# Patient Record
Sex: MALE | Race: WHITE | NOT HISPANIC OR LATINO | URBAN - NONMETROPOLITAN AREA
[De-identification: names, ages, dates, MRNs, and addresses within clinical notes are randomized per-mention and may not be internally consistent; named-entity substitution may affect disease eponyms.]

---

## 2017-01-01 ENCOUNTER — OFFICE VISIT - GICH (OUTPATIENT)
Dept: FAMILY MEDICINE | Facility: OTHER | Age: 82
End: 2017-01-01

## 2017-01-01 ENCOUNTER — HISTORY (OUTPATIENT)
Dept: FAMILY MEDICINE | Facility: OTHER | Age: 82
End: 2017-01-01

## 2017-01-01 ENCOUNTER — HOSPITAL ENCOUNTER (OUTPATIENT)
Dept: RADIOLOGY | Facility: OTHER | Age: 82
End: 2017-10-10
Attending: FAMILY MEDICINE

## 2017-01-01 ENCOUNTER — MEDICAL CORRESPONDENCE (OUTPATIENT)
Facility: CLINIC | Age: 82
End: 2017-01-01
Payer: COMMERCIAL

## 2017-01-01 ENCOUNTER — AMBULATORY - GICH (OUTPATIENT)
Dept: FAMILY MEDICINE | Facility: OTHER | Age: 82
End: 2017-01-01

## 2017-01-01 ENCOUNTER — AMBULATORY - GICH (OUTPATIENT)
Dept: SCHEDULING | Facility: OTHER | Age: 82
End: 2017-01-01

## 2017-01-01 ENCOUNTER — ANTICOAGULATION - GICH (OUTPATIENT)
Dept: INTERNAL MEDICINE | Facility: OTHER | Age: 82
End: 2017-01-01

## 2017-01-01 ENCOUNTER — HOSPITAL ENCOUNTER (OUTPATIENT)
Dept: RADIOLOGY | Facility: OTHER | Age: 82
End: 2017-10-19
Attending: FAMILY MEDICINE

## 2017-01-01 ENCOUNTER — COMMUNICATION - GICH (OUTPATIENT)
Dept: FAMILY MEDICINE | Facility: OTHER | Age: 82
End: 2017-01-01

## 2017-01-01 DIAGNOSIS — R07.89 OTHER CHEST PAIN: ICD-10-CM

## 2017-01-01 DIAGNOSIS — I48.20 CHRONIC ATRIAL FIBRILLATION (H): ICD-10-CM

## 2017-01-01 DIAGNOSIS — I48.91 ATRIAL FIBRILLATION (H): ICD-10-CM

## 2017-01-01 DIAGNOSIS — B36.9 SUPERFICIAL MYCOSIS: ICD-10-CM

## 2017-01-01 DIAGNOSIS — J06.9 ACUTE UPPER RESPIRATORY INFECTION: ICD-10-CM

## 2017-01-01 DIAGNOSIS — Z79.4 LONG TERM CURRENT USE OF INSULIN (H): ICD-10-CM

## 2017-01-01 DIAGNOSIS — Z23 ENCOUNTER FOR IMMUNIZATION: ICD-10-CM

## 2017-01-01 DIAGNOSIS — Z79.01 LONG TERM CURRENT USE OF ANTICOAGULANT: ICD-10-CM

## 2017-01-01 DIAGNOSIS — E78.00 PURE HYPERCHOLESTEROLEMIA: ICD-10-CM

## 2017-01-01 DIAGNOSIS — Z95.0 PRESENCE OF CARDIAC PACEMAKER: ICD-10-CM

## 2017-01-01 DIAGNOSIS — R05.9 COUGH: ICD-10-CM

## 2017-01-01 DIAGNOSIS — E11.9 TYPE 2 DIABETES MELLITUS WITHOUT COMPLICATIONS (H): ICD-10-CM

## 2017-01-01 DIAGNOSIS — L98.9 DISORDER OF SKIN OR SUBCUTANEOUS TISSUE: ICD-10-CM

## 2017-01-01 DIAGNOSIS — E08.42 DIABETES MELLITUS DUE TO UNDERLYING CONDITION WITH DIABETIC POLYNEUROPATHY (H): ICD-10-CM

## 2017-01-01 DIAGNOSIS — F43.21 ADJUSTMENT DISORDER WITH DEPRESSED MOOD: ICD-10-CM

## 2017-01-01 DIAGNOSIS — L57.0 ACTINIC KERATOSIS: ICD-10-CM

## 2017-01-01 DIAGNOSIS — M54.6 PAIN IN THORACIC SPINE: ICD-10-CM

## 2017-01-01 LAB
ALB RAND URINE - HISTORICAL: 13.7 MG/L
ANION GAP - HISTORICAL: 11 (ref 5–18)
BUN SERPL-MCNC: 16 MG/DL (ref 7–25)
BUN/CREAT RATIO - HISTORICAL: 16
CALCIUM SERPL-MCNC: 9.5 MG/DL (ref 8.6–10.3)
CHLORIDE SERPLBLD-SCNC: 106 MMOL/L (ref 98–107)
CO2 SERPL-SCNC: 26 MMOL/L (ref 21–31)
CREAT SERPL-MCNC: 0.99 MG/DL (ref 0.7–1.3)
CREATININE, URINE - HISTORICAL: 1.13 G/L
ESTIMATED AVERAGE GLUCOSE: 126 MG/DL
GFR IF NOT AFRICAN AMERICAN - HISTORICAL: >60 ML/MIN/1.73M2
GLUCOSE SERPL-MCNC: 70 MG/DL (ref 70–105)
HEMOGLOBIN A1C MONITORING (POCT) - HISTORICAL: 6 % (ref 4–6.2)
INR - HISTORICAL: 2.5
INR - HISTORICAL: 2.7
MICROALBUMIN, RAND UR - HISTORICAL: 12.1 MG/G CREAT
POTASSIUM SERPL-SCNC: 3.9 MMOL/L (ref 3.5–5.1)
SODIUM SERPL-SCNC: 143 MMOL/L (ref 133–143)

## 2017-01-01 PROCEDURE — 93306 TTE W/DOPPLER COMPLETE: CPT | Mod: 26 | Performed by: INTERNAL MEDICINE

## 2017-01-01 ASSESSMENT — PATIENT HEALTH QUESTIONNAIRE - PHQ9: SUM OF ALL RESPONSES TO PHQ QUESTIONS 1-9: 0

## 2017-01-02 ENCOUNTER — COMMUNICATION - GICH (OUTPATIENT)
Dept: FAMILY MEDICINE | Facility: OTHER | Age: 82
End: 2017-01-02

## 2017-01-02 DIAGNOSIS — E78.00 PURE HYPERCHOLESTEROLEMIA: ICD-10-CM

## 2017-01-11 ENCOUNTER — ANTICOAGULATION - GICH (OUTPATIENT)
Dept: INTERNAL MEDICINE | Facility: OTHER | Age: 82
End: 2017-01-11

## 2017-01-11 DIAGNOSIS — Z79.01 LONG TERM CURRENT USE OF ANTICOAGULANT: ICD-10-CM

## 2017-01-11 DIAGNOSIS — I48.91 ATRIAL FIBRILLATION (H): ICD-10-CM

## 2017-01-11 LAB
ERYTHROCYTE [DISTWIDTH] IN BLOOD BY AUTOMATED COUNT: 12.3 % (ref 11.5–15.5)
HCT VFR BLD AUTO: 48.8 % (ref 37–53)
HEMOGLOBIN: 17.1 G/DL (ref 13.5–17.5)
INR - HISTORICAL: 1.9
MCH RBC QN AUTO: 33.3 PG (ref 26–34)
MCHC RBC AUTO-ENTMCNC: 35.1 G/DL (ref 32–36)
MCV RBC AUTO: 95 FL (ref 80–100)
PLATELET # BLD AUTO: 133 THOU/CU MM (ref 140–440)
PMV BLD: 8.7 FL (ref 6.5–11)
RED BLOOD COUNT - HISTORICAL: 5.15 MIL/CU MM (ref 4.3–5.9)
WHITE BLOOD COUNT - HISTORICAL: 7.1 THOU/CU MM (ref 4.5–11)

## 2017-02-06 ENCOUNTER — AMBULATORY - GICH (OUTPATIENT)
Dept: SCHEDULING | Facility: OTHER | Age: 82
End: 2017-02-06

## 2017-02-08 ENCOUNTER — ANTICOAGULATION - GICH (OUTPATIENT)
Dept: INTERNAL MEDICINE | Facility: OTHER | Age: 82
End: 2017-02-08

## 2017-02-08 DIAGNOSIS — I48.91 ATRIAL FIBRILLATION (H): ICD-10-CM

## 2017-02-08 DIAGNOSIS — Z79.01 LONG TERM CURRENT USE OF ANTICOAGULANT: ICD-10-CM

## 2017-02-08 LAB — INR - HISTORICAL: 2.4

## 2017-02-10 ENCOUNTER — COMMUNICATION - GICH (OUTPATIENT)
Dept: FAMILY MEDICINE | Facility: OTHER | Age: 82
End: 2017-02-10

## 2017-02-10 DIAGNOSIS — M50.30 OTHER CERVICAL DISC DEGENERATION, UNSPECIFIED CERVICAL REGION: ICD-10-CM

## 2017-02-20 ENCOUNTER — AMBULATORY - GICH (OUTPATIENT)
Dept: SCHEDULING | Facility: OTHER | Age: 82
End: 2017-02-20

## 2017-03-03 ENCOUNTER — OFFICE VISIT - GICH (OUTPATIENT)
Dept: FAMILY MEDICINE | Facility: OTHER | Age: 82
End: 2017-03-03

## 2017-03-03 ENCOUNTER — COMMUNICATION - GICH (OUTPATIENT)
Dept: FAMILY MEDICINE | Facility: OTHER | Age: 82
End: 2017-03-03

## 2017-03-03 ENCOUNTER — HISTORY (OUTPATIENT)
Dept: FAMILY MEDICINE | Facility: OTHER | Age: 82
End: 2017-03-03

## 2017-03-03 DIAGNOSIS — E11.3599 TYPE 2 DIABETES MELLITUS WITH PROLIFERATIVE RETINOPATHY WITHOUT MACULAR EDEMA (H): ICD-10-CM

## 2017-03-06 ENCOUNTER — AMBULATORY - GICH (OUTPATIENT)
Dept: SCHEDULING | Facility: OTHER | Age: 82
End: 2017-03-06

## 2017-04-04 ENCOUNTER — HISTORY (OUTPATIENT)
Dept: FAMILY MEDICINE | Facility: OTHER | Age: 82
End: 2017-04-04

## 2017-04-04 ENCOUNTER — OFFICE VISIT - GICH (OUTPATIENT)
Dept: FAMILY MEDICINE | Facility: OTHER | Age: 82
End: 2017-04-04

## 2017-04-04 ENCOUNTER — COMMUNICATION - GICH (OUTPATIENT)
Dept: FAMILY MEDICINE | Facility: OTHER | Age: 82
End: 2017-04-04

## 2017-04-04 DIAGNOSIS — M50.30 OTHER CERVICAL DISC DEGENERATION, UNSPECIFIED CERVICAL REGION: ICD-10-CM

## 2017-04-04 DIAGNOSIS — E11.9 TYPE 2 DIABETES MELLITUS WITHOUT COMPLICATIONS (H): ICD-10-CM

## 2017-04-04 DIAGNOSIS — Z79.4 LONG TERM CURRENT USE OF INSULIN (H): ICD-10-CM

## 2017-04-04 DIAGNOSIS — E11.3599 TYPE 2 DIABETES MELLITUS WITH PROLIFERATIVE RETINOPATHY WITHOUT MACULAR EDEMA (H): ICD-10-CM

## 2017-04-04 DIAGNOSIS — E78.00 PURE HYPERCHOLESTEROLEMIA: ICD-10-CM

## 2017-04-04 DIAGNOSIS — I48.91 ATRIAL FIBRILLATION (H): ICD-10-CM

## 2017-05-03 ENCOUNTER — ANTICOAGULATION - GICH (OUTPATIENT)
Dept: INTERNAL MEDICINE | Facility: OTHER | Age: 82
End: 2017-05-03

## 2017-05-03 DIAGNOSIS — I48.91 ATRIAL FIBRILLATION (H): ICD-10-CM

## 2017-05-03 DIAGNOSIS — Z79.01 LONG TERM CURRENT USE OF ANTICOAGULANT: ICD-10-CM

## 2017-05-03 LAB — INR - HISTORICAL: 2.6

## 2017-05-19 ENCOUNTER — HISTORY (OUTPATIENT)
Dept: FAMILY MEDICINE | Facility: OTHER | Age: 82
End: 2017-05-19

## 2017-05-19 ENCOUNTER — OFFICE VISIT - GICH (OUTPATIENT)
Dept: FAMILY MEDICINE | Facility: OTHER | Age: 82
End: 2017-05-19

## 2017-05-19 DIAGNOSIS — L57.0 ACTINIC KERATOSIS: ICD-10-CM

## 2017-12-27 NOTE — PROGRESS NOTES
Patient Information     Patient Name MRN Sex Khoi Luo [id number] Male 3/29/1932      Progress Notes by Stephen Morataya MD at 2017 10:15 AM     Author:  Stephen Morataya MD Service:  (none) Author Type:  Physician     Filed:  2017 10:46 AM Encounter Date:  2017 Status:  Signed     :  Stephen Morataya MD (Physician)            SUBJECTIVE:    Khoi Rodriguez is a 85 y.o. male who presents for follow up face lesion     HPI    He is no longer having any itching or burning.  Some pulling from the sutures.  No bleeding.  Has been applying ketoconazole now twice daily and his home care nurse says it is about 50% smaller.    Pathology report was non diagnostic.  Showed inflammation but no cancer.    No Known Allergies,   Current Outpatient Prescriptions on File Prior to Visit       Medication  Sig Dispense Refill     aspirin 81 mg tablet Take 81 mg by mouth once daily with a meal.       gabapentin (NEURONTIN) 300 mg capsule Take 1 capsule by mouth 3 times daily. 270 capsule 3     insulin aspart protamine-insulin aspart (NOVOLOG MIX 70-30) 100 unit/mL (70-30) FLEXPEN Inject 40 Units subcutaneous 2 times daily before meals. 100 mL 3     ketoconazole 2% topical (NIZORAL) cream Apply  topically to affected area(s) 2 times daily. 1 Tube 3     melatonin 10 mg tab Take  by mouth.  0     metroNIDAZOLE (METROGEL) 0.75 % gel Apply  topically to affected area(s) 2 times daily. 45 g 11     nitroglycerin (NITROSTAT) 0.3 mg SL tablet Place 0.3 mg under the tongue every 5 minutes if needed.       nystatin (MYCOSTATIN) cream Apply  topically to affected area(s) 2 times daily. To rash       simvastatin (ZOCOR) 40 mg tablet Take 1 tablet by mouth at bedtime. 90 tablet 0     warfarin (COUMADIN) 2.5 mg tablet TAKE ONE TABLET BY MOUTH SIX DAYS A WEEK AND THEN TWO TABLETS BY MOUTH ONE DAY PER WEEK 180 tablet 3     No current facility-administered medications on file prior to visit.    ,   Past Medical History:      Diagnosis  Date     Abnormal cardiac CT angiography     Occlusion of 2 obtuse marginal stents,70% mid LAD  lesion stented.      Atrial fibrillation (HC)      Atrial fibrillation (HC)      Closed compression fracture of thoracic vertebra (HC) 10/22/2015    Incidental finding on CXR 10/21/15--T8-9--new from CXR 2009      Coronary artery stenting 06/2006     Coronary heart disease      Coronary heart disease      Diabetes mellitus, type 2 (HC)      Diabetes mellitus, type 2 (HC)      Hx of left cataract extraction      LAD stenosis 07/06    Followup angiography showed occlusion of the two obtuse marginal stents, 70% mid LAD lesion which was stented.        S/P coronary artery stent placement 2006    Arthrectomy      S/P laparoscopic cholecystectomy 05/01     S/P placement of cardiac pacemaker 2002    and   Past Surgical History:      Procedure  Laterality Date     CATARACT REMOVAL      Left eye       LAP CHOLECYSTECTOMY  2001       REVIEW OF SYSTEMS:  Review of Systems   Constitutional: Negative for chills and fever.   Skin: Positive for rash. Negative for itching.   Neurological: Negative for headaches.       OBJECTIVE:  /68  Resp 16    EXAM:   Physical Exam   Constitutional: He is oriented to person, place, and time and well-developed, well-nourished, and in no distress. No distress.   Neurological: He is alert and oriented to person, place, and time.   Skin: He is not diaphoretic.   Right cheek lesion now is about 1.5 cm or so.  sig less irritaiton, no ulceration.  2 sutures from biopsy removed.       ASSESSMENT/PLAN:    ICD-10-CM    1. 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  We talked about a Dermatology consult next, but given this is at least 50% better now, I assume it is a recurrence of the original fungal infection which was on the first biopsy years ago.  He is comfortable for now continuing with the ketaconazole twice daily for at least 3-4 weeks more. Follow up as  needed.    Stephen Morataya MD ....................  7/28/2017   10:45 AM

## 2017-12-28 NOTE — PATIENT INSTRUCTIONS
Patient Information     Patient Name MRN Khoi Dinh [id number] Male 3/29/1932      Patient Instructions by Jazmin Montiel RN at 10/10/2017 10:00 AM     Author:  Jazmin Montiel RN Service:  (none) Author Type:  NURS- Registered Nurse     Filed:  10/10/2017 10:01 AM Encounter Date:  10/10/2017 Status:  Signed     :  Jazmin Montiel RN (NURS- Registered Nurse)            2017 Details    Sun Mon Tue Wed Thu Fri Sat     1               2               3               4               5               6               7                 8               9               10      2.5 mg   See details      11      2.5 mg         12      5 mg         13      2.5 mg         14      2.5 mg           15      2.5 mg         16      2.5 mg         17      2.5 mg         18      2.5 mg         19      5 mg         20      2.5 mg         21      2.5 mg           22      2.5 mg         23      2.5 mg         24      2.5 mg         25      2.5 mg         26      5 mg         27      2.5 mg         28      2.5 mg           29      2.5 mg         30      2.5 mg         31      2.5 mg              Date Details   10/10 This INR check               How to take your warfarin dose     To take:  2.5 mg Take one of the 2.5 mg tablets.    To take:  5 mg Take two of the 2.5 mg tablets.           2017 Details    Sun Mon Tue Wed Thu Fri Sat        1      2.5 mg         2      5 mg         3      2.5 mg         4      2.5 mg           5      2.5 mg         6      2.5 mg         7      2.5 mg         8      2.5 mg         9      5 mg         10      2.5 mg         11      2.5 mg           12      2.5 mg         13      2.5 mg         14      2.5 mg         15      2.5 mg         16      5 mg         17      2.5 mg         18      2.5 mg           19      2.5 mg         20      2.5 mg         21      2.5 mg         22      2.5 mg         23      5 mg         24      2.5 mg         25      2.5 mg           26       2.5 mg         27      2.5 mg         28      2.5 mg         29      2.5 mg         30      5 mg            Date Details   No additional details            How to take your warfarin dose     To take:  2.5 mg Take one of the 2.5 mg tablets.    To take:  5 mg Take two of the 2.5 mg tablets.           December 2017 Details    Sun Mon Tue Wed Thu Fri Sat          1      2.5 mg         2      2.5 mg           3      2.5 mg         4      2.5 mg         5      2.5 mg         6      2.5 mg         7      5 mg         8      2.5 mg         9      2.5 mg           10      2.5 mg         11      2.5 mg         12      2.5 mg         13      2.5 mg         14      5 mg         15      2.5 mg         16      2.5 mg           17      2.5 mg         18      2.5 mg         19      2.5 mg         20      2.5 mg         21      5 mg         22      2.5 mg         23      2.5 mg           24      2.5 mg         25      2.5 mg         26      2.5 mg         27      2.5 mg         28      5 mg         29      2.5 mg         30      2.5 mg           31      2.5 mg                Date Details   No additional details            How to take your warfarin dose     To take:  2.5 mg Take one of the 2.5 mg tablets.    To take:  5 mg Take two of the 2.5 mg tablets.           January 2018 Details    Sun Mon Tue Wed Thu Fri Sat      1      2.5 mg         2      2.5 mg         3               4               5               6                 7               8               9               10               11               12               13                 14               15               16               17               18               19               20                 21               22               23               24               25               26               27                 28               29               30               31                   Date Details   No additional details    Date of next INR:  1/2/2018         How  to take your warfarin dose     To take:  2.5 mg Take one of the 2.5 mg tablets.             Description          Continue same Warfarin dose and recheck in 12 weeks  Jazmin Montiel RN    10/10/2017  10:00 AM    Reiterated reasons to come have INR checked sooner                                    Anticoagulation Summary as of 10/10/2017     INR goal 2.0-3.0    Today's INR 2.5    Next INR check 1/2/2018          Call your Anticoagulation Clinic at Dept: [phone]   if:   1. Any medications are started, stopped, or there is a change in dose.  2. You experience any bleeding that is not easily stopped or if it is recurrent.  3. You notice an increase in bruising or any bruising that does not heal.  4. You are scheduled for surgery, colonoscopy, dental extraction or any other procedure where you may need to stop your Coumadin (warfarin).

## 2017-12-28 NOTE — PROGRESS NOTES
Patient Information     Patient Name MRN Sex Khoi Blanca [id number] Male 3/29/1932      Progress Notes by Stephen Morataya MD at 2017 10:00 AM     Author:  Stephen Morataya MD Service:  (none) Author Type:  Physician     Filed:  2017 10:21 AM Encounter Date:  2017 Status:  Signed     :  Stephen Morataya MD (Physician)            SUBJECTIVE:    Khoi Rodriguez is a 85 y.o. male who presents for back pain and cough    HPI    4 days of cough, sneezing and frontal sinus pain.  Had some nasal discharge at first, this is gone.  Has gotten hoarse in the last day.  No fevers.  No known exposures, but spends time at the [employer].  Mild clear sputum.    Upper back pain is much better.  Mild flare from the cough.    He feels the celexa has helped his mood.  No side effects.    No Known Allergies,   Current Outpatient Prescriptions on File Prior to Visit       Medication  Sig Dispense Refill     aspirin 81 mg tablet Take 81 mg by mouth once daily with a meal.       citalopram (CELEXA) 10 mg tablet Take 1 tablet by mouth once daily. 90 tablet 3     gabapentin (NEURONTIN) 300 mg capsule Take 1 capsule by mouth 3 times daily. 270 capsule 3     insulin aspart protamine-insulin aspart (NOVOLOG MIX 70-30) 100 unit/mL (70-30) FLEXPEN Inject 40 Units subcutaneous 2 times daily before meals. 100 mL 3     ketoconazole 2% topical (NIZORAL) cream Apply  topically to affected area(s) 2 times daily. 1 Tube 3     melatonin 10 mg tab Take  by mouth.  0     metroNIDAZOLE (METROGEL) 0.75 % gel Apply  topically to affected area(s) 2 times daily. 45 g 11     nitroglycerin (NITROSTAT) 0.3 mg SL tablet Place 0.3 mg under the tongue every 5 minutes if needed.       nystatin (MYCOSTATIN) cream Apply  topically to affected area(s) 2 times daily. To rash       simvastatin (ZOCOR) 40 mg tablet TAKE 1 TABLET BY MOUTH AT BEDTIME 90 tablet 2     traMADol (ULTRAM) 50 mg tablet Take 1 tablet by mouth every 6 hours if needed  for Pain. 30 tablet 3     warfarin (COUMADIN) 2.5 mg tablet TAKE ONE TABLET BY MOUTH SIX DAYS A WEEK AND THEN TWO TABLETS BY MOUTH ONE DAY PER WEEK 180 tablet 3     No current facility-administered medications on file prior to visit.    ,   Past Medical History:     Diagnosis  Date     Abnormal cardiac CT angiography     Occlusion of 2 obtuse marginal stents,70% mid LAD  lesion stented.      Atrial fibrillation (HC)      Atrial fibrillation (HC)      Closed compression fracture of thoracic vertebra (HC) 10/22/2015    Incidental finding on CXR 10/21/15--T8-9--new from CXR 2009      Coronary artery stenting 06/2006     Coronary heart disease      Coronary heart disease      Diabetes mellitus, type 2 (HC)      Diabetes mellitus, type 2 (HC)      Hx of left cataract extraction      LAD stenosis 07/06    Followup angiography showed occlusion of the two obtuse marginal stents, 70% mid LAD lesion which was stented.        S/P coronary artery stent placement 2006    Arthrectomy      S/P laparoscopic cholecystectomy 05/01     S/P placement of cardiac pacemaker 2002    and   Past Surgical History:      Procedure  Laterality Date     CATARACT REMOVAL      Left eye       LAP CHOLECYSTECTOMY  2001       REVIEW OF SYSTEMS:  Review of Systems   Constitutional: Negative for chills and fever.   HENT: Positive for congestion and sore throat.    Respiratory: Positive for cough and sputum production.    Musculoskeletal: Negative for back pain.   Psychiatric/Behavioral: Negative for depression.       OBJECTIVE:  /62  Temp 98.6  F (37  C) (Temporal)  Resp 16  Wt 86.8 kg (191 lb 6.4 oz)  BMI 28.35 kg/m2    EXAM:   Physical Exam   Constitutional: He is oriented to person, place, and time and well-developed, well-nourished, and in no distress. No distress.   HENT:   Mild pharyngeal injection and mild nasal edema.   Neck: Normal range of motion. No tracheal deviation present. No thyromegaly present.   Pulmonary/Chest: Effort  normal. No respiratory distress. He has no wheezes. He has no rales.   Neurological: He is alert and oriented to person, place, and time.   Skin: He is not diaphoretic.   Psychiatric: Memory, affect and judgment normal.       PHQ Depression Screening 10/31/2017 11/29/2017   Date of PHQ exam (doc flow) 10/31/2017 11/29/2017   1. Lack of interest/pleasure 0 - Not at all 0 - Not at all   2. Feeling down/depressed 0 - Not at all 0 - Not at all   PHQ-2 TOTAL SCORE 0 0   3. Trouble sleeping 0 - Not at all -   4. Decreased energy 0 - Not at all -   5. Appetite change 0 - Not at all -   6. Feelings of failure 0 - Not at all -   7. Trouble concentrating 0 - Not at all -   8. Activity level 0 - Not at all -   9. Hurting yourself 0 - Not at all -   PHQ-9 TOTAL SCORE 0 -   PHQ-9 Severity Level none -   Functional Impairment not applicable -   Some recent data might be hidden         ASSESSMENT/PLAN:    ICD-10-CM    1. Acute URI J06.9         Plan:  The upper respiratory infection is already improving.  Supportive cares and follow up as needed.    Stephen Morataya MD ....................  11/29/2017   10:20 AM

## 2017-12-28 NOTE — ADDENDUM NOTE
Patient Information     Patient Name MRN Khoi Dinh [id number] Male 3/29/1932      Addendum Note by Stephen Morataya MD at 10/10/2017 12:06 PM     Author:  Stephen Morataya MD Service:  (none) Author Type:  Physician     Filed:  10/10/2017 12:06 PM Encounter Date:  10/10/2017 Status:  Signed     :  Stephen Morataya MD (Physician)       Addended by: STEPHEN MORATAYA on: 10/10/2017 12:06 PM        Modules accepted: Orders

## 2017-12-28 NOTE — PROGRESS NOTES
Patient Information     Patient Name MRN Sex Khoi Blanca [id number] Male 3/29/1932      Progress Notes by Stephen Morataya MD at 10/31/2017 10:45 AM     Author:  Stephen Morataya MD Service:  (none) Author Type:  Physician     Filed:  10/31/2017 11:11 AM Encounter Date:  10/31/2017 Status:  Signed     :  Stephen Morataya MD (Physician)            SUBJECTIVE:    Khoi Rodriguez is a 85 y.o. male who presents for back pain    HPI    Has had pains now for over 6 weeks.  Had a chest radiograph, showed a stable lower thoracic compression fractures.  He has pains now really in the T 4 area or so.  Between the scapulae.  Worse when he gets into bed, but improves once laying flat.  Ultram is not really helping.  Does not feel truly weak.  Coughing and sneezing makes the pains much worse.  Pain much worse with deep breaths.  No fevers.      Mildly productive cough as well.  No better nor worse from 3 weeks ago.  Clear sputum.    Feeling more depressed, with winter coming on and the pending 2 year anniversary of his wife's death.  Is home alone much of the time.  Goes to the [facility] daily, otherwise is pretty isolated.  Is sad about not being able to go deer hunting.  A long term friend of his just  3 weeks ago.  He went to the  in [place].  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    No Known Allergies,   Current Outpatient Prescriptions on File Prior to Visit       Medication  Sig Dispense Refill     aspirin 81 mg tablet Take 81 mg by mouth once daily with a meal.       gabapentin (NEURONTIN) 300 mg capsule Take 1 capsule by mouth 3 times daily. 270 capsule 3     insulin aspart protamine-insulin aspart (NOVOLOG MIX 70-30) 100 unit/mL (70-30) FLEXPEN Inject 40 Units subcutaneous 2 times daily before meals. 100 mL 3     ketoconazole 2% topical (NIZORAL) cream Apply  topically to affected area(s) 2 times daily. 1 Tube 3     melatonin 10 mg tab Take  by mouth.  0      metroNIDAZOLE (METROGEL) 0.75 % gel Apply  topically to affected area(s) 2 times daily. 45 g 11     nitroglycerin (NITROSTAT) 0.3 mg SL tablet Place 0.3 mg under the tongue every 5 minutes if needed.       nystatin (MYCOSTATIN) cream Apply  topically to affected area(s) 2 times daily. To rash       simvastatin (ZOCOR) 40 mg tablet TAKE 1 TABLET BY MOUTH AT BEDTIME 90 tablet 2     traMADol (ULTRAM) 50 mg tablet Take 1 tablet by mouth every 6 hours if needed for Pain. 30 tablet 3     warfarin (COUMADIN) 2.5 mg tablet TAKE ONE TABLET BY MOUTH SIX DAYS A WEEK AND THEN TWO TABLETS BY MOUTH ONE DAY PER WEEK 180 tablet 3     No current facility-administered medications on file prior to visit.    ,   Past Medical History:     Diagnosis  Date     Abnormal cardiac CT angiography     Occlusion of 2 obtuse marginal stents,70% mid LAD  lesion stented.      Atrial fibrillation (HC)      Atrial fibrillation (HC)      Closed compression fracture of thoracic vertebra (HC) 10/22/2015    Incidental finding on CXR 10/21/15--T8-9--new from CXR 2009      Coronary artery stenting 06/2006     Coronary heart disease      Coronary heart disease      Diabetes mellitus, type 2 (HC)      Diabetes mellitus, type 2 (HC)      Hx of left cataract extraction      LAD stenosis 07/06    Followup angiography showed occlusion of the two obtuse marginal stents, 70% mid LAD lesion which was stented.        S/P coronary artery stent placement 2006    Arthrectomy      S/P laparoscopic cholecystectomy 05/01     S/P placement of cardiac pacemaker 2002    and   Past Surgical History:      Procedure  Laterality Date     CATARACT REMOVAL      Left eye       LAP CHOLECYSTECTOMY  2001       REVIEW OF SYSTEMS:  Review of Systems   Constitutional: Negative for chills and fever.   Respiratory: Positive for cough and sputum production.    Musculoskeletal: Positive for back pain.   Psychiatric/Behavioral: Positive for depression.       OBJECTIVE:  /72  Pulse 84  " Ht 1.75 m (5' 8.9\")  Wt 87.1 kg (192 lb 2 oz)  BMI 28.46 kg/m2    EXAM:   Physical Exam   Constitutional: He is oriented to person, place, and time and well-developed, well-nourished, and in no distress. No distress.   Pulmonary/Chest: Effort normal and breath sounds normal. No respiratory distress. He has no wheezes. He has no rales. He exhibits no tenderness.   Musculoskeletal:   No midline thoracic spine tenderness.  Some pain just medial to the right scapula, deep in the chest wall and not in rhomboid.   Neurological: He is alert and oriented to person, place, and time.   Skin: He is not diaphoretic.   Psychiatric:   Normal mood and affect at first.  When talking about his friend, he nearly started crying, but tried hard to not.     PHQ Depression Screening 10/10/2017 10/31/2017   Date of PHQ exam (doc flow) 10/10/2017 10/31/2017   1. Lack of interest/pleasure 0 - Not at all 0 - Not at all   2. Feeling down/depressed 0 - Not at all 0 - Not at all   PHQ-2 TOTAL SCORE 0 0   3. Trouble sleeping - 0 - Not at all   4. Decreased energy - 0 - Not at all   5. Appetite change - 0 - Not at all   6. Feelings of failure - 0 - Not at all   7. Trouble concentrating - 0 - Not at all   8. Activity level - 0 - Not at all   9. Hurting yourself - 0 - Not at all   PHQ-9 TOTAL SCORE - 0   PHQ-9 Severity Level - none   Functional Impairment - not applicable   Some recent data might be hidden         ASSESSMENT/PLAN:    ICD-10-CM    1. Chest wall pain R07.89    2. 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  He is on warfarin, so I want to avoid NSAIDs.  I suspect he came in more to talk about his mood, and he is comfortable just observing the pain.    The PHQ is normal, but he still has some ongoing grief from his wife's death.  Will start him on a low dose of celexa, 10 mg and " follow up in 4 weeks or so.  20/25 minutes counseling total today about this.    Stephen Morataya MD ....................  10/31/2017   11:09 AM

## 2017-12-28 NOTE — TELEPHONE ENCOUNTER
Patient Information     Patient Name MRN Khoi Dinh [id number] Male 3/29/1932      Telephone Encounter by Nicky Chiu RN at 8/3/2017  8:37 AM     Author:  Nicky Chiu RN Service:  (none) Author Type:  NURS- Registered Nurse     Filed:  8/3/2017  8:50 AM Encounter Date:  2017 Status:  Signed     :  Nicky Chiu RN (NURS- Registered Nurse)            Statins    Office visit in the past 12 months.    Last visit with CATHERINE FLOWER was on: 2017 in [facility]  Next visit with CATHERINE FLOWER is on: No future appointment listed with this provider  Next visit with Family Practice is on: No future appointment listed in this department    Lab testing requirements:  Lipids annually.  Repeat lipids 6-8 weeks after dosage or drug change.    Last Lipids:  Chol: 156    2015  T    2015  HDL:   45    2015  LDL:  79    2015  LDL DIRECT:  No results found in past 5 years    .    Concommitant use of fibrates and statins-If it is an addition to the medication list, review note and/or discuss with provider.  If already on medication list, refill.    Max refills 12 months from last office visit.       Prescription refilled per RN Medication Refill Policy.................... Nicky Chiu RN ....................  8/3/2017   8:42 AM

## 2017-12-28 NOTE — PROGRESS NOTES
Patient Information     Patient Name MRN Sex Khoi Blanca [id number] Male 3/29/1932      Progress Notes by Stephen Morataya MD at 10/10/2017 10:45 AM     Author:  Stephen Morataya MD Service:  (none) Author Type:  Physician     Filed:  10/10/2017 12:05 PM Encounter Date:  10/10/2017 Status:  Signed     :  Stephen Morataya MD (Physician)            SUBJECTIVE:    Khoi Rodriguez is a 85 y.o. male who presents for cough     HPI    Has had a coarse cough for about 4 weeks.  Will get clear sputum at times.  hoarseness off and on.  No fevers.  No falls.    Sharp pains in the upper t-spine.  Last week was using a leaf blower, pain came on 1-2 days after.  Worse when he lays flat in bed.  Has not taken any pain medications. Has had compression fractures before.    No Known Allergies,   Current Outpatient Prescriptions on File Prior to Visit       Medication  Sig Dispense Refill     aspirin 81 mg tablet Take 81 mg by mouth once daily with a meal.       gabapentin (NEURONTIN) 300 mg capsule Take 1 capsule by mouth 3 times daily. 270 capsule 3     insulin aspart protamine-insulin aspart (NOVOLOG MIX 70-30) 100 unit/mL (70-30) FLEXPEN Inject 40 Units subcutaneous 2 times daily before meals. 100 mL 3     ketoconazole 2% topical (NIZORAL) cream Apply  topically to affected area(s) 2 times daily. 1 Tube 3     melatonin 10 mg tab Take  by mouth.  0     metroNIDAZOLE (METROGEL) 0.75 % gel Apply  topically to affected area(s) 2 times daily. 45 g 11     nitroglycerin (NITROSTAT) 0.3 mg SL tablet Place 0.3 mg under the tongue every 5 minutes if needed.       nystatin (MYCOSTATIN) cream Apply  topically to affected area(s) 2 times daily. To rash       simvastatin (ZOCOR) 40 mg tablet TAKE 1 TABLET BY MOUTH AT BEDTIME 90 tablet 2     warfarin (COUMADIN) 2.5 mg tablet TAKE ONE TABLET BY MOUTH SIX DAYS A WEEK AND THEN TWO TABLETS BY MOUTH ONE DAY PER WEEK 180 tablet 3     No current facility-administered medications on  file prior to visit.    ,   Past Medical History:     Diagnosis  Date     Abnormal cardiac CT angiography     Occlusion of 2 obtuse marginal stents,70% mid LAD  lesion stented.      Atrial fibrillation (HC)      Atrial fibrillation (HC)      Closed compression fracture of thoracic vertebra (HC) 10/22/2015    Incidental finding on CXR 10/21/15--T8-9--new from CXR 2009      Coronary artery stenting 06/2006     Coronary heart disease      Coronary heart disease      Diabetes mellitus, type 2 (HC)      Diabetes mellitus, type 2 (HC)      Hx of left cataract extraction      LAD stenosis 07/06    Followup angiography showed occlusion of the two obtuse marginal stents, 70% mid LAD lesion which was stented.        S/P coronary artery stent placement 2006    Arthrectomy      S/P laparoscopic cholecystectomy 05/01     S/P placement of cardiac pacemaker 2002    and   Past Surgical History:      Procedure  Laterality Date     CATARACT REMOVAL      Left eye       LAP CHOLECYSTECTOMY  2001       REVIEW OF SYSTEMS:  Review of Systems   Constitutional: Negative for chills and fever.   HENT: Negative for congestion.    Respiratory: Positive for cough and sputum production. Negative for shortness of breath.    Cardiovascular: Negative for chest pain.   Musculoskeletal: Positive for back pain.   Neurological: Negative for headaches.       OBJECTIVE:  /64  Temp 98.9  F (37.2  C) (Temporal)  Resp 16  Wt 88.5 kg (195 lb 3.2 oz)  BMI 29.04 kg/m2    EXAM:   Physical Exam   Constitutional: He is well-developed, well-nourished, and in no distress. No distress.   HENT:   Head: Normocephalic and atraumatic.   Right Ear: External ear normal.   Left Ear: External ear normal.   Mouth/Throat: Oropharynx is clear and moist.   Eyes: Pupils are equal, round, and reactive to light.   Neck: Neck supple. No tracheal deviation present. No thyromegaly present.   Pulmonary/Chest: No respiratory distress. He has no rales. He exhibits no  tenderness.   Mild splinting with deep inspiration   Skin: He is not diaphoretic.     Chest radiograph shows bilateral interstitial prominence, but no ashok infiltrates.  Worse from last CHEST RADIOGRAPH.  Has what appears to be a T4 as well as a T7 or 8 compression fracture.  ASSESSMENT/PLAN:    ICD-10-CM    1. Productive cough R05 XR CHEST 2 VIEWS PA AND LATERAL   2. Thoracic spine pain M54.6         Plan:  I suspect the cough is a residual from a mild uri, perhaps has some post nasal drip. Chest radiograph today shows changes consistent with CHF.  Will proceed with the echocardiogram, last was 2010    Regarding the back, has had compression fractures before.  Will trial ultram for the pain, with follow up in 4 weeks or so.    Stephen Morataya MD ....................  10/10/2017   12:04 PM

## 2017-12-28 NOTE — ADDENDUM NOTE
Patient Information     Patient Name MRN Khoi Dinh [id number] Male 3/29/1932      Addendum Note by Berny Lorenzana at 10/10/2017 12:17 PM     Author:  Berny Lorenzana Service:  (none) Author Type:  (none)     Filed:  10/10/2017 12:17 PM Encounter Date:  10/10/2017 Status:  Signed     :  Berny Lorenzana       Addended by: BERNY LORENZANA on: 10/10/2017 12:17 PM        Modules accepted: Orders

## 2017-12-28 NOTE — TELEPHONE ENCOUNTER
Patient Information     Patient Name MRN Khoi Dinh [id number] Male 3/29/1932      Telephone Encounter by Stephen Morataya MD at 2017  7:46 AM     Author:  Stephen Morataya MD Service:  (none) Author Type:  Physician     Filed:  2017  7:47 AM Encounter Date:  2017 Status:  Signed     :  Stephen Morataya MD (Physician)            Topical medications are not likely to interact with oral warfarin.  Have him stop the advil however.  Stephen Morataya MD ....................  2017   7:47 AM

## 2017-12-28 NOTE — PROGRESS NOTES
Patient Information     Patient Name MRN Sex Khoi Blanca [id number] Male 3/29/1932      Progress Notes by Stephen Morataya MD at 2017 10:30 AM     Author:  Stephen Morataya MD Service:  (none) Author Type:  Physician     Filed:  2017 11:10 AM Encounter Date:  2017 Status:  Signed     :  Stephen Morataya MD (Physician)            SUBJECTIVE:    Khoi Rodriguez is a 85 y.o. male who presents for follow up face lesion    HPI    Last week was having significant burning from the Efudex.  He stopped it 3 days ago and the burning is now resolved.  Has been applying Ketoconazole instead.  Noted some flaking from the area while on the Efudex.  Bx years ago was positive for dermatophytes.    No Known Allergies,   Current Outpatient Prescriptions on File Prior to Visit       Medication  Sig Dispense Refill     aspirin 81 mg tablet Take 81 mg by mouth once daily with a meal.       gabapentin (NEURONTIN) 300 mg capsule Take 1 capsule by mouth 3 times daily. 270 capsule 3     insulin aspart protamine-insulin aspart (NOVOLOG MIX 70-30) 100 unit/mL (70-30) FLEXPEN Inject 40 Units subcutaneous 2 times daily before meals. 100 mL 3     ketoconazole 2% topical (NIZORAL) cream Apply  topically to affected area(s) 2 times daily. 1 Tube 3     melatonin 10 mg tab Take  by mouth.  0     metroNIDAZOLE (METROGEL) 0.75 % gel Apply  topically to affected area(s) 2 times daily. 45 g 11     nitroglycerin (NITROSTAT) 0.3 mg SL tablet Place 0.3 mg under the tongue every 5 minutes if needed.       nystatin (MYCOSTATIN) cream Apply  topically to affected area(s) 2 times daily. To rash       simvastatin (ZOCOR) 40 mg tablet Take 1 tablet by mouth at bedtime. 90 tablet 0     warfarin (COUMADIN) 2.5 mg tablet TAKE ONE TABLET BY MOUTH SIX DAYS A WEEK AND THEN TWO TABLETS BY MOUTH ONE DAY PER WEEK 180 tablet 3     No current facility-administered medications on file prior to visit.    ,   Past Medical History:      Diagnosis  Date     Abnormal cardiac CT angiography     Occlusion of 2 obtuse marginal stents,70% mid LAD  lesion stented.      Atrial fibrillation (HC)      Atrial fibrillation (HC)      Closed compression fracture of thoracic vertebra (HC) 10/22/2015    Incidental finding on CXR 10/21/15--T8-9--new from CXR 2009      Coronary artery stenting 06/2006     Coronary heart disease      Coronary heart disease      Diabetes mellitus, type 2 (HC)      Diabetes mellitus, type 2 (HC)      Hx of left cataract extraction      LAD stenosis 07/06    Followup angiography showed occlusion of the two obtuse marginal stents, 70% mid LAD lesion which was stented.        S/P coronary artery stent placement 2006    Arthrectomy      S/P laparoscopic cholecystectomy 05/01     S/P placement of cardiac pacemaker 2002    and   Past Surgical History:      Procedure  Laterality Date     CATARACT REMOVAL      Left eye       LAP CHOLECYSTECTOMY  2001       REVIEW OF SYSTEMS:  Review of Systems   Constitutional: Negative for chills and fever.   Skin: Positive for rash. Negative for itching.   Neurological: Positive for tingling.       OBJECTIVE:  /68  Resp 16  Wt 90 kg (198 lb 6.4 oz)  BMI 29.52 kg/m2    EXAM:   Physical Exam   Constitutional: He is well-developed, well-nourished, and in no distress. No distress.   Skin: He is not diaphoretic.   Right malar eminence area with ulcer, now about 3x2 cm.  Raised and rolled borders.  Small area prepped and infiltrated with 1% lidocaine.  3 mm punch obtained.  Tissue was very friable.  Moderate bleeding for size of punch, due to warfarin I presume.  Required 2 simple interrupted sutures of 6-0 ethilon.   Psychiatric: Memory, affect and judgment normal.       ASSESSMENT/PLAN:    ICD-10-CM    1. Skin lesion of face L98.9 pathology specimen      AK BIOPSY OF SKIN LESION SINGLE        Plan:  clinically this is not behaving like a typical dermatophyte and I suspect a bcc or squamous cell  carcinoma.  Will have him continue with ketoconazole while waiting for the pathology report.  Wound cares discussed with patient, sutures out in 4 days.    Stephen Morataya MD ....................  7/24/2017   11:10 AM

## 2017-12-28 NOTE — TELEPHONE ENCOUNTER
Patient Information     Patient Name MRN Khoi Dinh [id number] Male 3/29/1932      Telephone Encounter by Keren Barbosa RN at 2017  3:47 PM     Author:  Keren Barbosa RN Service:  (none) Author Type:  NURS- Registered Nurse     Filed:  2017  3:59 PM Encounter Date:  2017 Status:  Signed     :  Keren Barbosa RN (NURS- Registered Nurse)            URGENT: Response needed within 24 hours    This timeframe is established by CMS as  best practice  for the delivery of home health care. The home health clinician may need to contact you again if this timeframe is not met.      S:    Medication reconciliation discrepancies and/or drug interactions/contraindications have been identified.  Home Care s drug regime review has revealed significant medication issues.    B:    You are being contacted for orders related to medication issues.     A:     I met with client today in the home and reviewed his medication list and noted the following medication interactions as follows:     Moderate level     1. Zocor / Coumadin     Major     1. Aspirin (Adult low dose)/ Coumadin   2. Aspirin (Adult low dose) / Advil   3. Zocor/ Ketoconazole   4. Coumadin / Advil   5. Coumadin / Ketoconazole   6. Coumadin / Metronidazole      Also the noted the following medication discrepancies compared to epic med list and noted the following changes as follows:     1. Novolog mix Insulin 70/30 - inject 20 units twice daily before meals (per client, not 40 units twice daily)   2. Ketoconazole 2 % apply topically to affected areas twice daily (per client as needed)   3. Discontinue Nitrostat, client is not using   4. Discontinue nystatin cream, client is not using   5. Metronidazole 0.75% gel apply topically twice daily, (per client as needed)   6. (New) Aspercream 10 % topical cream, apply as needed after baths for muscle pain    7. (New) Advil 200 mg 1 tab every 4-6 hrs as needed for back/muscle  pain, (client states he is taking rarely)    Please update epic med list    R:    Please evaluate this information and indicate below whether or not changes are required. A copy of the patient's drug interaction/contraindications report is available upon request.     Thank you for your time. Please call with questions or concerns.     Keren Barbosa RN

## 2017-12-29 NOTE — PATIENT INSTRUCTIONS
Patient Information     Patient Name MRN Khoi Dinh [id number] Male 3/29/1932      Patient Instructions by Chela Womack RN at 2017 10:00 AM     Author:  Chela Womack RN Service:  (none) Author Type:  NURS- Registered Nurse     Filed:  2017  9:52 AM Encounter Date:  2017 Status:  Signed     :  Chela Womack RN (NURS- Registered Nurse)            2017 Details    Sun Mon Tue Wed Thu Fri Sat           1                 2               3               4               5               6               7               8                 9               10               11               12               13               14               15                 16               17               18               19               20               21               22                 23               24               25               26      2.5 mg   See details      27      5 mg         28      2.5 mg         29      2.5 mg           30      2.5 mg         31      2.5 mg               Date Details    This INR check               How to take your warfarin dose     To take:  2.5 mg Take one of the 2.5 mg tablets.    To take:  5 mg Take two of the 2.5 mg tablets.           2017 Details    Sun Mon Tue Wed Thu Fri Sat       1      2.5 mg         2      2.5 mg         3      5 mg         4      2.5 mg         5      2.5 mg           6      2.5 mg         7      2.5 mg         8      2.5 mg         9      2.5 mg         10      5 mg         11      2.5 mg         12      2.5 mg           13      2.5 mg         14      2.5 mg         15      2.5 mg         16      2.5 mg         17      5 mg         18      2.5 mg         19      2.5 mg           20      2.5 mg         21      2.5 mg         22      2.5 mg         23      2.5 mg         24      5 mg         25      2.5 mg         26      2.5 mg           27      2.5 mg         28      2.5 mg         29      2.5 mg         30      2.5  mg         31      5 mg            Date Details   No additional details            How to take your warfarin dose     To take:  2.5 mg Take one of the 2.5 mg tablets.    To take:  5 mg Take two of the 2.5 mg tablets.           September 2017 Details    Sun Mon Tue Wed Thu Fri Sat          1      2.5 mg         2      2.5 mg           3      2.5 mg         4      2.5 mg         5      2.5 mg         6      2.5 mg         7      5 mg         8      2.5 mg         9      2.5 mg           10      2.5 mg         11      2.5 mg         12      2.5 mg         13      2.5 mg         14      5 mg         15      2.5 mg         16      2.5 mg           17      2.5 mg         18      2.5 mg         19      2.5 mg         20      2.5 mg         21      5 mg         22      2.5 mg         23      2.5 mg           24      2.5 mg         25      2.5 mg         26      2.5 mg         27      2.5 mg         28      5 mg         29      2.5 mg         30      2.5 mg          Date Details   No additional details            How to take your warfarin dose     To take:  2.5 mg Take one of the 2.5 mg tablets.    To take:  5 mg Take two of the 2.5 mg tablets.           October 2017 Details    Sun Mon Tue Wed Thu Fri Sat     1      2.5 mg         2      2.5 mg         3      2.5 mg         4      2.5 mg         5      5 mg         6      2.5 mg         7      2.5 mg           8      2.5 mg         9      2.5 mg         10      2.5 mg         11               12               13               14                 15               16               17               18               19               20               21                 22               23               24               25               26               27               28                 29               30               31                    Date Details   No additional details    Date of next INR:  10/10/2017         How to take your warfarin dose     To take:  2.5 mg Take one  of the 2.5 mg tablets.    To take:  5 mg Take two of the 2.5 mg tablets.             Description          Continue same Warfarin dose and recheck in 12 weeks  MILDRED ANDREWS RN ....................  7/26/2017   9:52 AM    Reiterated reasons to come have INR checked sooner                                    Anticoagulation Summary as of 7/26/2017     INR goal 2.0-3.0    Today's INR 2.7    Next INR check 10/10/2017          Call your Anticoagulation Clinic at Dept: [phone]   if:   1. Any medications are started, stopped, or there is a change in dose.  2. You experience any bleeding that is not easily stopped or if it is recurrent.  3. You notice an increase in bruising or any bruising that does not heal.  4. You are scheduled for surgery, colonoscopy, dental extraction or any other procedure where you may need to stop your Coumadin (warfarin).

## 2017-12-30 NOTE — NURSING NOTE
Patient Information     Patient Name MRN Khoi Dinh [id number] Male 3/29/1932      Nursing Note by Keeley Mirza at 2017 10:00 AM     Author:  Keeley Mirza Service:  (none) Author Type:  (none)     Filed:  2017  9:42 AM Encounter Date:  2017 Status:  Signed     :  Keeley Mirza            Coming in for a f/u on a spot by  his Rt eye, also a diabetic check.  Keeley Mirza ....................  2017   9:36 AM

## 2017-12-30 NOTE — NURSING NOTE
Patient Information     Patient Name MRN Sex Khoi Blanca [id number] Male 3/29/1932      Nursing Note by Juani Mckenna at 10/31/2017 10:45 AM     Author:  Juani Mckenna Service:  (none) Author Type:  (none)     Filed:  10/31/2017 10:50 AM Encounter Date:  10/31/2017 Status:  Signed     :  Juani Mckenna            Patient presents to clinic today for upper back pain starting a month or so ago. He states no injury occurred.     Juani Mckenna LPN...................10/31/2017  10:29 AM    Previous A1C is at goal of <8  HEMOGLOBIN A1C MONITORING (POCT)    Date Value   2017 6.0 %   2013 7.1 % NGSP (H)     Urine microalbumin:creatine: 17  Foot exam 17  Eye exam 17    Patient is not a current smoker  Patient is on a daily aspirin  Patient is on a Statin.  Blood pressure today of 120/72 is at the goal of <139/89 for diabetics.    Juani Mckenna LPN..............10/31/2017 10:32 AM

## 2017-12-30 NOTE — NURSING NOTE
Patient Information     Patient Name MRN Khoi Dinh [id number] Male 3/29/1932      Nursing Note by Keeley Mirza at 2017 10:15 AM     Author:  Keeley Mirza Service:  (none) Author Type:  (none)     Filed:  2017 10:35 AM Encounter Date:  2017 Status:  Signed     :  Keeley Mirza            Removal of stitches.  Keeley Mirza ....................  2017   10:17 AM

## 2017-12-30 NOTE — NURSING NOTE
Patient Information     Patient Name MRN Khoi Dinh [id number] Male 3/29/1932      Nursing Note by Keeley Mirza at 10/10/2017 10:45 AM     Author:  Keeley Mirza Service:  (none) Author Type:  (none)     Filed:  10/10/2017 11:20 AM Encounter Date:  10/10/2017 Status:  Signed     :  Keeley Mirza            Coming in for a cough, times two months, phlegm is clear, gets hoarse after talking. Pain in the back times two weeks, upper part. Flu shot  Keeley Mirza ....................  10/10/2017   10:54 AM

## 2017-12-30 NOTE — NURSING NOTE
Patient Information     Patient Name MRN Khoi Dinh [id number] Male 3/29/1932      Nursing Note by Keeley Mirza at 2017 10:00 AM     Author:  Keeley Mirza Service:  (none) Author Type:  (none)     Filed:  2017 10:13 AM Encounter Date:  2017 Status:  Signed     :  Keeley Mirza            Coming in for a 4 month f/u, coughing with little phlegm, headache, sneezing times 4 days  Keeley Mirza ....................  2017   10:00 AM

## 2017-12-30 NOTE — NURSING NOTE
Patient Information     Patient Name MRN Khoi Dinh [id number] Male 3/29/1932      Nursing Note by Keeley Miraz at 2017 10:30 AM     Author:  Keeley Mirza Service:  (none) Author Type:  (none)     Filed:  2017 10:41 AM Encounter Date:  2017 Status:  Signed     :  Keeley Mirza            Coming in for a reaction to the Rx cream   Keeley Mirza ....................  2017   10:26 AM

## 2018-01-01 ENCOUNTER — MEDICAL CORRESPONDENCE (OUTPATIENT)
Dept: HEALTH INFORMATION MANAGEMENT | Facility: OTHER | Age: 83
End: 2018-01-01

## 2018-01-01 ENCOUNTER — APPOINTMENT (OUTPATIENT)
Dept: CT IMAGING | Facility: OTHER | Age: 83
End: 2018-01-01
Attending: FAMILY MEDICINE
Payer: MEDICARE

## 2018-01-01 ENCOUNTER — ONCOLOGY VISIT (OUTPATIENT)
Dept: ONCOLOGY | Facility: OTHER | Age: 83
End: 2018-01-01
Attending: INTERNAL MEDICINE
Payer: COMMERCIAL

## 2018-01-01 ENCOUNTER — DOCUMENTATION ONLY (OUTPATIENT)
Dept: FAMILY MEDICINE | Facility: OTHER | Age: 83
End: 2018-01-01

## 2018-01-01 ENCOUNTER — HISTORY (OUTPATIENT)
Dept: FAMILY MEDICINE | Facility: OTHER | Age: 83
End: 2018-01-01

## 2018-01-01 ENCOUNTER — OFFICE VISIT (OUTPATIENT)
Dept: FAMILY MEDICINE | Facility: OTHER | Age: 83
End: 2018-01-01
Attending: FAMILY MEDICINE
Payer: MEDICARE

## 2018-01-01 ENCOUNTER — TELEPHONE (OUTPATIENT)
Dept: FAMILY MEDICINE | Facility: OTHER | Age: 83
End: 2018-01-01

## 2018-01-01 ENCOUNTER — ANTICOAGULATION - GICH (OUTPATIENT)
Dept: INTERNAL MEDICINE | Facility: OTHER | Age: 83
End: 2018-01-01

## 2018-01-01 ENCOUNTER — OFFICE VISIT - GICH (OUTPATIENT)
Dept: FAMILY MEDICINE | Facility: OTHER | Age: 83
End: 2018-01-01

## 2018-01-01 ENCOUNTER — APPOINTMENT (OUTPATIENT)
Dept: GENERAL RADIOLOGY | Facility: OTHER | Age: 83
End: 2018-01-01
Attending: FAMILY MEDICINE
Payer: MEDICARE

## 2018-01-01 ENCOUNTER — HOSPITAL ENCOUNTER (OUTPATIENT)
Dept: CT IMAGING | Facility: OTHER | Age: 83
Discharge: HOME | End: 2018-03-21
Attending: FAMILY MEDICINE | Admitting: FAMILY MEDICINE
Payer: MEDICARE

## 2018-01-01 ENCOUNTER — OFFICE VISIT (OUTPATIENT)
Dept: FAMILY MEDICINE | Facility: OTHER | Age: 83
End: 2018-01-01
Attending: NURSE PRACTITIONER
Payer: MEDICARE

## 2018-01-01 ENCOUNTER — HOSPITAL ENCOUNTER (EMERGENCY)
Facility: OTHER | Age: 83
Discharge: HOME | End: 2018-03-12
Attending: FAMILY MEDICINE | Admitting: FAMILY MEDICINE
Payer: MEDICARE

## 2018-01-01 ENCOUNTER — COMMUNICATION - GICH (OUTPATIENT)
Dept: FAMILY MEDICINE | Facility: OTHER | Age: 83
End: 2018-01-01

## 2018-01-01 ENCOUNTER — HOSPITAL ENCOUNTER (OUTPATIENT)
Dept: GENERAL RADIOLOGY | Facility: OTHER | Age: 83
Discharge: HOME | End: 2018-02-28
Attending: NURSE PRACTITIONER | Admitting: NURSE PRACTITIONER
Payer: MEDICARE

## 2018-01-01 VITALS
BODY MASS INDEX: 29.25 KG/M2 | BODY MASS INDEX: 28.35 KG/M2 | WEIGHT: 198.4 LBS | SYSTOLIC BLOOD PRESSURE: 128 MMHG | WEIGHT: 196.6 LBS | SYSTOLIC BLOOD PRESSURE: 128 MMHG | SYSTOLIC BLOOD PRESSURE: 126 MMHG | DIASTOLIC BLOOD PRESSURE: 76 MMHG | WEIGHT: 198.4 LBS | RESPIRATION RATE: 14 BRPM | WEIGHT: 200 LBS | BODY MASS INDEX: 29.52 KG/M2 | RESPIRATION RATE: 16 BRPM | SYSTOLIC BLOOD PRESSURE: 126 MMHG | DIASTOLIC BLOOD PRESSURE: 68 MMHG | BODY MASS INDEX: 29.52 KG/M2 | TEMPERATURE: 98.9 F | DIASTOLIC BLOOD PRESSURE: 68 MMHG | DIASTOLIC BLOOD PRESSURE: 62 MMHG | DIASTOLIC BLOOD PRESSURE: 70 MMHG | RESPIRATION RATE: 16 BRPM | DIASTOLIC BLOOD PRESSURE: 72 MMHG | BODY MASS INDEX: 29.04 KG/M2 | RESPIRATION RATE: 14 BRPM | TEMPERATURE: 98.6 F | RESPIRATION RATE: 16 BRPM | WEIGHT: 195.2 LBS | WEIGHT: 191.4 LBS | SYSTOLIC BLOOD PRESSURE: 126 MMHG | RESPIRATION RATE: 16 BRPM | SYSTOLIC BLOOD PRESSURE: 136 MMHG | SYSTOLIC BLOOD PRESSURE: 124 MMHG | DIASTOLIC BLOOD PRESSURE: 64 MMHG | RESPIRATION RATE: 16 BRPM

## 2018-01-01 VITALS
SYSTOLIC BLOOD PRESSURE: 122 MMHG | OXYGEN SATURATION: 98 % | BODY MASS INDEX: 27.56 KG/M2 | WEIGHT: 181.25 LBS | DIASTOLIC BLOOD PRESSURE: 76 MMHG | TEMPERATURE: 98 F | HEART RATE: 66 BPM

## 2018-01-01 VITALS
WEIGHT: 191.13 LBS | BODY MASS INDEX: 28.31 KG/M2 | HEART RATE: 76 BPM | HEIGHT: 69 IN | SYSTOLIC BLOOD PRESSURE: 134 MMHG | DIASTOLIC BLOOD PRESSURE: 82 MMHG

## 2018-01-01 VITALS
SYSTOLIC BLOOD PRESSURE: 153 MMHG | TEMPERATURE: 98.1 F | HEIGHT: 69 IN | BODY MASS INDEX: 26.66 KG/M2 | HEART RATE: 80 BPM | OXYGEN SATURATION: 94 % | WEIGHT: 180 LBS | RESPIRATION RATE: 23 BRPM | DIASTOLIC BLOOD PRESSURE: 69 MMHG

## 2018-01-01 VITALS
WEIGHT: 192.13 LBS | HEART RATE: 84 BPM | SYSTOLIC BLOOD PRESSURE: 120 MMHG | HEIGHT: 69 IN | BODY MASS INDEX: 28.46 KG/M2 | DIASTOLIC BLOOD PRESSURE: 72 MMHG

## 2018-01-01 VITALS
TEMPERATURE: 97.9 F | HEART RATE: 72 BPM | DIASTOLIC BLOOD PRESSURE: 70 MMHG | HEIGHT: 69 IN | SYSTOLIC BLOOD PRESSURE: 126 MMHG

## 2018-01-01 VITALS
HEART RATE: 72 BPM | SYSTOLIC BLOOD PRESSURE: 126 MMHG | BODY MASS INDEX: 26.14 KG/M2 | DIASTOLIC BLOOD PRESSURE: 70 MMHG | WEIGHT: 177.13 LBS | TEMPERATURE: 97.9 F

## 2018-01-01 VITALS
RESPIRATION RATE: 16 BRPM | WEIGHT: 197 LBS | SYSTOLIC BLOOD PRESSURE: 126 MMHG | BODY MASS INDEX: 29.31 KG/M2 | DIASTOLIC BLOOD PRESSURE: 68 MMHG

## 2018-01-01 VITALS
DIASTOLIC BLOOD PRESSURE: 68 MMHG | WEIGHT: 188.4 LBS | BODY MASS INDEX: 27.9 KG/M2 | SYSTOLIC BLOOD PRESSURE: 122 MMHG | RESPIRATION RATE: 16 BRPM | TEMPERATURE: 98.7 F

## 2018-01-01 VITALS — SYSTOLIC BLOOD PRESSURE: 124 MMHG | DIASTOLIC BLOOD PRESSURE: 64 MMHG | RESPIRATION RATE: 18 BRPM

## 2018-01-01 VITALS
SYSTOLIC BLOOD PRESSURE: 138 MMHG | HEART RATE: 60 BPM | BODY MASS INDEX: 27.86 KG/M2 | TEMPERATURE: 97.5 F | HEIGHT: 68 IN | DIASTOLIC BLOOD PRESSURE: 70 MMHG | WEIGHT: 183.8 LBS

## 2018-01-01 DIAGNOSIS — C79.51 SPINE METASTASIS: Primary | ICD-10-CM

## 2018-01-01 DIAGNOSIS — Z79.01 LONG TERM CURRENT USE OF ANTICOAGULANT: ICD-10-CM

## 2018-01-01 DIAGNOSIS — C78.7 LIVER METASTASIS: ICD-10-CM

## 2018-01-01 DIAGNOSIS — C79.51 SPINE METASTASIS: ICD-10-CM

## 2018-01-01 DIAGNOSIS — J18.9 PNEUMONIA OF LEFT LOWER LOBE DUE TO INFECTIOUS ORGANISM: ICD-10-CM

## 2018-01-01 DIAGNOSIS — I48.21 PERMANENT ATRIAL FIBRILLATION (H): ICD-10-CM

## 2018-01-01 DIAGNOSIS — R79.1 ELEVATED INR: ICD-10-CM

## 2018-01-01 DIAGNOSIS — I48.91 ATRIAL FIBRILLATION (H): Primary | ICD-10-CM

## 2018-01-01 DIAGNOSIS — Z79.4 LONG TERM CURRENT USE OF INSULIN (H): ICD-10-CM

## 2018-01-01 DIAGNOSIS — L08.9 SKIN INFECTION: ICD-10-CM

## 2018-01-01 DIAGNOSIS — E11.9 TYPE 2 DIABETES MELLITUS WITHOUT COMPLICATIONS (H): ICD-10-CM

## 2018-01-01 DIAGNOSIS — L98.9 SKIN LESION: Primary | ICD-10-CM

## 2018-01-01 DIAGNOSIS — R05.9 COUGH: Primary | ICD-10-CM

## 2018-01-01 DIAGNOSIS — C78.7 METASTATIC ADENOCARCINOMA TO LIVER (H): Primary | ICD-10-CM

## 2018-01-01 DIAGNOSIS — I48.91 ATRIAL FIBRILLATION (H): ICD-10-CM

## 2018-01-01 DIAGNOSIS — L29.9 PRURITIC DISORDER: ICD-10-CM

## 2018-01-01 DIAGNOSIS — I48.19 PERSISTENT ATRIAL FIBRILLATION (H): ICD-10-CM

## 2018-01-01 DIAGNOSIS — D49.0 LIVER TUMOR: ICD-10-CM

## 2018-01-01 DIAGNOSIS — J06.9 UPPER RESPIRATORY TRACT INFECTION, UNSPECIFIED TYPE: ICD-10-CM

## 2018-01-01 DIAGNOSIS — L30.9 DERMATITIS: ICD-10-CM

## 2018-01-01 DIAGNOSIS — L85.3 XEROSIS CUTIS: ICD-10-CM

## 2018-01-01 DIAGNOSIS — C78.7 METASTATIC CANCER TO LIVER (H): Primary | ICD-10-CM

## 2018-01-01 DIAGNOSIS — J20.9 ACUTE BRONCHITIS, UNSPECIFIED ORGANISM: ICD-10-CM

## 2018-01-01 DIAGNOSIS — Z79.01 CHRONIC ANTICOAGULATION: ICD-10-CM

## 2018-01-01 DIAGNOSIS — C78.7 METASTATIC CANCER TO LIVER (H): ICD-10-CM

## 2018-01-01 LAB
ABSOLUTE BASOPHILS - HISTORICAL: 0.1 THOU/CU MM
ABSOLUTE EOSINOPHILS - HISTORICAL: 0.4 THOU/CU MM
ABSOLUTE IMMATURE GRANULOCYTES(METAS,MYELOS,PROS) - HISTORICAL: 0.1 THOU/CU MM
ABSOLUTE LYMPHOCYTES - HISTORICAL: 1.7 THOU/CU MM (ref 0.9–2.9)
ABSOLUTE MONOCYTES - HISTORICAL: 1.1 THOU/CU MM
ABSOLUTE NEUTROPHILS - HISTORICAL: 4.9 THOU/CU MM (ref 1.7–7)
ALBUMIN SERPL-MCNC: 3.4 G/DL (ref 3.5–5.7)
ALBUMIN SERPL-MCNC: 3.5 G/DL (ref 3.5–5.7)
ALBUMIN UR-MCNC: ABNORMAL MG/DL
ALP SERPL-CCNC: 208 U/L (ref 34–104)
ALP SERPL-CCNC: 213 U/L (ref 34–104)
ALT SERPL W P-5'-P-CCNC: 22 U/L (ref 7–52)
ALT SERPL W P-5'-P-CCNC: 23 U/L (ref 7–52)
ANION GAP SERPL CALCULATED.3IONS-SCNC: 10 MMOL/L (ref 3–14)
ANION GAP SERPL CALCULATED.3IONS-SCNC: 8 MMOL/L (ref 3–14)
APPEARANCE UR: CLEAR
AST SERPL W P-5'-P-CCNC: 89 U/L (ref 13–39)
AST SERPL W P-5'-P-CCNC: 93 U/L (ref 13–39)
BACTERIA SPEC CULT: ABNORMAL
BACTERIA SPEC CULT: ABNORMAL
BACTERIA SPEC CULT: NORMAL
BACTERIA SPEC CULT: NORMAL
BASOPHILS # BLD AUTO: 0.1 10E9/L (ref 0–0.2)
BASOPHILS # BLD AUTO: 0.1 10E9/L (ref 0–0.2)
BASOPHILS # BLD AUTO: 1.2 %
BASOPHILS NFR BLD AUTO: 1.4 %
BASOPHILS NFR BLD AUTO: 1.5 %
BILIRUB SERPL-MCNC: 1.8 MG/DL (ref 0.3–1)
BILIRUB SERPL-MCNC: 2.1 MG/DL (ref 0.3–1)
BILIRUB UR QL STRIP: ABNORMAL
BUN SERPL-MCNC: 13 MG/DL (ref 7–25)
BUN SERPL-MCNC: 13 MG/DL (ref 7–25)
CALCIUM SERPL-MCNC: 9.5 MG/DL (ref 8.6–10.3)
CALCIUM SERPL-MCNC: 9.6 MG/DL (ref 8.6–10.3)
CAOX CRY #/AREA URNS HPF: ABNORMAL /HPF
CHLORIDE SERPL-SCNC: 101 MMOL/L (ref 98–107)
CHLORIDE SERPL-SCNC: 101 MMOL/L (ref 98–107)
CO2 SERPL-SCNC: 27 MMOL/L (ref 21–31)
CO2 SERPL-SCNC: 29 MMOL/L (ref 21–31)
COLOR UR AUTO: ABNORMAL
CREAT SERPL-MCNC: 0.86 MG/DL (ref 0.7–1.3)
CREAT SERPL-MCNC: 0.91 MG/DL (ref 0.7–1.3)
CRP SERPL-MCNC: 3.1 MG/L
DIFFERENTIAL METHOD BLD: ABNORMAL
DIFFERENTIAL METHOD BLD: ABNORMAL
EOSINOPHIL # BLD AUTO: 0.7 10E9/L (ref 0–0.7)
EOSINOPHIL # BLD AUTO: 0.8 10E9/L (ref 0–0.7)
EOSINOPHIL NFR BLD AUTO: 5 %
EOSINOPHIL NFR BLD AUTO: 6.6 %
EOSINOPHIL NFR BLD AUTO: 8.2 %
ERYTHROCYTE [DISTWIDTH] IN BLOOD BY AUTOMATED COUNT: 13.8 % (ref 10–15)
ERYTHROCYTE [DISTWIDTH] IN BLOOD BY AUTOMATED COUNT: 13.9 % (ref 11.5–15.5)
ERYTHROCYTE [DISTWIDTH] IN BLOOD BY AUTOMATED COUNT: 14.4 % (ref 10–15)
ERYTHROCYTE [SEDIMENTATION RATE] IN BLOOD BY WESTERGREN METHOD: 18 MM/H (ref 1–10)
ESTIMATED AVERAGE GLUCOSE: 131 MG/DL
GFR SERPL CREATININE-BSD FRML MDRD: 79 ML/MIN/1.7M2
GFR SERPL CREATININE-BSD FRML MDRD: 85 ML/MIN/1.7M2
GLUCOSE SERPL-MCNC: 101 MG/DL (ref 70–105)
GLUCOSE SERPL-MCNC: 123 MG/DL (ref 70–105)
GLUCOSE UR STRIP-MCNC: NEGATIVE MG/DL
HCT VFR BLD AUTO: 45.6 % (ref 40–53)
HCT VFR BLD AUTO: 50.4 % (ref 40–53)
HCT VFR BLD AUTO: 51.4 % (ref 37–53)
HEMOGLOBIN A1C MONITORING (POCT) - HISTORICAL: 6.2 % (ref 4–6.2)
HEMOGLOBIN: 17.2 G/DL (ref 13.5–17.5)
HGB BLD-MCNC: 16.2 G/DL (ref 13.3–17.7)
HGB BLD-MCNC: 17.4 G/DL (ref 13.3–17.7)
HGB UR QL STRIP: ABNORMAL
HYALINE CASTS #/AREA URNS LPF: ABNORMAL /LPF
IMM GRANULOCYTES # BLD: 0.1 10E9/L (ref 0–0.4)
IMM GRANULOCYTES # BLD: 0.1 10E9/L (ref 0–0.4)
IMM GRANULOCYTES NFR BLD: 0.9 %
IMM GRANULOCYTES NFR BLD: 1.1 %
IMMATURE GRANULOCYTES(METAS,MYELOS,PROS) - HISTORICAL: 0.7 %
INR - HISTORICAL: 2.3
INR PPP: 1.71 (ref 0–1.3)
INR PPP: 8.77 (ref 0–1.3)
KETONES UR STRIP-MCNC: ABNORMAL MG/DL
LEUKOCYTE ESTERASE UR QL STRIP: NEGATIVE
LYMPHOCYTES # BLD AUTO: 1.3 10E9/L (ref 0.8–5.3)
LYMPHOCYTES # BLD AUTO: 1.7 10E9/L (ref 0.8–5.3)
LYMPHOCYTES NFR BLD AUTO: 13.4 %
LYMPHOCYTES NFR BLD AUTO: 17.7 %
LYMPHOCYTES NFR BLD AUTO: 20.9 % (ref 20–44)
MAGNESIUM SERPL-MCNC: 2.1 MG/DL (ref 1.9–2.7)
MCH RBC QN AUTO: 33.8 PG (ref 26–34)
MCH RBC QN AUTO: 34.7 PG (ref 26.5–33)
MCH RBC QN AUTO: 34.8 PG (ref 26.5–33)
MCHC RBC AUTO-ENTMCNC: 33.5 G/DL (ref 32–36)
MCHC RBC AUTO-ENTMCNC: 34.5 G/DL (ref 31.5–36.5)
MCHC RBC AUTO-ENTMCNC: 35.5 G/DL (ref 31.5–36.5)
MCV RBC AUTO: 100 FL (ref 78–100)
MCV RBC AUTO: 101 FL (ref 80–100)
MCV RBC AUTO: 98 FL (ref 78–100)
MONOCYTES # BLD AUTO: 1.3 10E9/L (ref 0–1.3)
MONOCYTES # BLD AUTO: 1.6 10E9/L (ref 0–1.3)
MONOCYTES NFR BLD AUTO: 12.8 %
MONOCYTES NFR BLD AUTO: 12.8 %
MONOCYTES NFR BLD AUTO: 16.8 %
MUCOUS THREADS #/AREA URNS LPF: PRESENT /LPF
NEUTROPHILS # BLD AUTO: 5.2 10E9/L (ref 1.6–8.3)
NEUTROPHILS # BLD AUTO: 6.4 10E9/L (ref 1.6–8.3)
NEUTROPHILS NFR BLD AUTO: 54.9 %
NEUTROPHILS NFR BLD AUTO: 59.4 % (ref 42–72)
NEUTROPHILS NFR BLD AUTO: 64.7 %
NITRATE UR QL: NEGATIVE
NON-SQ EPI CELLS #/AREA URNS LPF: ABNORMAL /LPF
PH UR STRIP: 5 PH (ref 5–7)
PLATELET # BLD AUTO: 139 10E9/L (ref 150–450)
PLATELET # BLD AUTO: 145 THOU/CU MM (ref 140–440)
PLATELET # BLD AUTO: 152 10E9/L (ref 150–450)
PMV BLD: 10.6 FL (ref 6.5–11)
POTASSIUM SERPL-SCNC: 3.3 MMOL/L (ref 3.5–5.1)
POTASSIUM SERPL-SCNC: 3.4 MMOL/L (ref 3.5–5.1)
PROT SERPL-MCNC: 7.1 G/DL (ref 6.4–8.9)
PROT SERPL-MCNC: 7.1 G/DL (ref 6.4–8.9)
PROTHROMBIN TIME: 20.5 S (ref 11.9–15.2)
RBC # BLD AUTO: 4.66 10E12/L (ref 4.4–5.9)
RBC # BLD AUTO: 5.02 10E12/L (ref 4.4–5.9)
RBC #/AREA URNS AUTO: ABNORMAL /HPF
RED BLOOD COUNT - HISTORICAL: 5.09 MIL/CU MM (ref 4.3–5.9)
SODIUM SERPL-SCNC: 138 MMOL/L (ref 134–144)
SODIUM SERPL-SCNC: 138 MMOL/L (ref 134–144)
SOURCE: ABNORMAL
SP GR UR STRIP: >1.03 (ref 1–1.03)
SPECIMEN SOURCE: ABNORMAL
SPECIMEN SOURCE: ABNORMAL
SPECIMEN SOURCE: NORMAL
SPECIMEN SOURCE: NORMAL
TSH SERPL DL<=0.05 MIU/L-ACNC: 2.76 IU/ML (ref 0.34–5.6)
UROBILINOGEN UR STRIP-ACNC: >8 EU/DL (ref 0.2–1)
WBC # BLD AUTO: 9.4 10E9/L (ref 4–11)
WBC # BLD AUTO: 9.9 10E9/L (ref 4–11)
WBC #/AREA URNS AUTO: ABNORMAL /HPF
WHITE BLOOD COUNT - HISTORICAL: 8.3 THOU/CU MM (ref 4.5–11)

## 2018-01-01 PROCEDURE — 81001 URINALYSIS AUTO W/SCOPE: CPT | Performed by: FAMILY MEDICINE

## 2018-01-01 PROCEDURE — 99213 OFFICE O/P EST LOW 20 MIN: CPT | Mod: 25 | Performed by: FAMILY MEDICINE

## 2018-01-01 PROCEDURE — 25000128 H RX IP 250 OP 636: Performed by: FAMILY MEDICINE

## 2018-01-01 PROCEDURE — 99285 EMERGENCY DEPT VISIT HI MDM: CPT | Mod: 25 | Performed by: FAMILY MEDICINE

## 2018-01-01 PROCEDURE — 86140 C-REACTIVE PROTEIN: CPT | Performed by: FAMILY MEDICINE

## 2018-01-01 PROCEDURE — 99204 OFFICE O/P NEW MOD 45 MIN: CPT | Performed by: INTERNAL MEDICINE

## 2018-01-01 PROCEDURE — 84443 ASSAY THYROID STIM HORMONE: CPT | Performed by: FAMILY MEDICINE

## 2018-01-01 PROCEDURE — 96367 TX/PROPH/DG ADDL SEQ IV INF: CPT | Performed by: FAMILY MEDICINE

## 2018-01-01 PROCEDURE — 87070 CULTURE OTHR SPECIMN AEROBIC: CPT | Mod: XU | Performed by: FAMILY MEDICINE

## 2018-01-01 PROCEDURE — 85610 PROTHROMBIN TIME: CPT | Mod: 91 | Performed by: FAMILY MEDICINE

## 2018-01-01 PROCEDURE — 85652 RBC SED RATE AUTOMATED: CPT | Performed by: FAMILY MEDICINE

## 2018-01-01 PROCEDURE — G0463 HOSPITAL OUTPT CLINIC VISIT: HCPCS

## 2018-01-01 PROCEDURE — 74177 CT ABD & PELVIS W/CONTRAST: CPT | Mod: TC

## 2018-01-01 PROCEDURE — 25000132 ZZH RX MED GY IP 250 OP 250 PS 637: Mod: GY | Performed by: FAMILY MEDICINE

## 2018-01-01 PROCEDURE — 85025 COMPLETE CBC W/AUTO DIFF WBC: CPT | Performed by: FAMILY MEDICINE

## 2018-01-01 PROCEDURE — 99215 OFFICE O/P EST HI 40 MIN: CPT | Performed by: FAMILY MEDICINE

## 2018-01-01 PROCEDURE — 36415 COLL VENOUS BLD VENIPUNCTURE: CPT | Performed by: FAMILY MEDICINE

## 2018-01-01 PROCEDURE — 71045 X-RAY EXAM CHEST 1 VIEW: CPT | Mod: FY

## 2018-01-01 PROCEDURE — 99285 EMERGENCY DEPT VISIT HI MDM: CPT | Mod: Z6 | Performed by: FAMILY MEDICINE

## 2018-01-01 PROCEDURE — 87040 BLOOD CULTURE FOR BACTERIA: CPT | Performed by: FAMILY MEDICINE

## 2018-01-01 PROCEDURE — A9270 NON-COVERED ITEM OR SERVICE: HCPCS | Mod: GY | Performed by: FAMILY MEDICINE

## 2018-01-01 PROCEDURE — 87077 CULTURE AEROBIC IDENTIFY: CPT | Performed by: FAMILY MEDICINE

## 2018-01-01 PROCEDURE — 99214 OFFICE O/P EST MOD 30 MIN: CPT | Performed by: NURSE PRACTITIONER

## 2018-01-01 PROCEDURE — 71260 CT THORAX DX C+: CPT

## 2018-01-01 PROCEDURE — 25000125 ZZHC RX 250: Performed by: FAMILY MEDICINE

## 2018-01-01 PROCEDURE — 80053 COMPREHEN METABOLIC PANEL: CPT | Performed by: FAMILY MEDICINE

## 2018-01-01 PROCEDURE — 83735 ASSAY OF MAGNESIUM: CPT | Performed by: FAMILY MEDICINE

## 2018-01-01 PROCEDURE — 11100 C UNLISTED SKIN PROCEDURE: CPT | Performed by: FAMILY MEDICINE

## 2018-01-01 PROCEDURE — 88305 TISSUE EXAM BY PATHOLOGIST: CPT | Performed by: FAMILY MEDICINE

## 2018-01-01 PROCEDURE — 85610 PROTHROMBIN TIME: CPT | Performed by: FAMILY MEDICINE

## 2018-01-01 PROCEDURE — 99214 OFFICE O/P EST MOD 30 MIN: CPT | Performed by: FAMILY MEDICINE

## 2018-01-01 PROCEDURE — 71046 X-RAY EXAM CHEST 2 VIEWS: CPT

## 2018-01-01 PROCEDURE — 96365 THER/PROPH/DIAG IV INF INIT: CPT | Performed by: FAMILY MEDICINE

## 2018-01-01 PROCEDURE — 96366 THER/PROPH/DIAG IV INF ADDON: CPT | Performed by: FAMILY MEDICINE

## 2018-01-01 PROCEDURE — G0463 HOSPITAL OUTPT CLINIC VISIT: HCPCS | Mod: 25

## 2018-01-01 PROCEDURE — 11100 ZZHC BIOPSY SKIN/SUBQ/MUC MEM, SINGLE LESION: CPT | Performed by: FAMILY MEDICINE

## 2018-01-01 RX ORDER — IODIXANOL 320 MG/ML
100 INJECTION, SOLUTION INTRAVASCULAR ONCE
Status: COMPLETED | OUTPATIENT
Start: 2018-01-01 | End: 2018-01-01

## 2018-01-01 RX ORDER — BENZONATATE 200 MG/1
200 CAPSULE ORAL 3 TIMES DAILY PRN
Qty: 21 CAPSULE | Refills: 0 | Status: SHIPPED | OUTPATIENT
Start: 2018-01-01 | End: 2018-01-01

## 2018-01-01 RX ORDER — TRIAMCINOLONE ACETONIDE 1 MG/G
CREAM TOPICAL
COMMUNITY
Start: 2018-01-01 | End: 2018-01-01

## 2018-01-01 RX ORDER — GABAPENTIN 300 MG/1
300 CAPSULE ORAL
COMMUNITY
Start: 2017-04-04

## 2018-01-01 RX ORDER — KETOCONAZOLE 20 MG/G
CREAM TOPICAL
COMMUNITY
Start: 2016-12-01

## 2018-01-01 RX ORDER — PHENOL 1.4 %
AEROSOL, SPRAY (ML) MUCOUS MEMBRANE
COMMUNITY
Start: 2016-11-16 | End: 2018-01-01

## 2018-01-01 RX ORDER — GUAIFENESIN AND CODEINE PHOSPHATE 100; 10 MG/5ML; MG/5ML
1-2 SOLUTION ORAL EVERY 6 HOURS PRN
Qty: 420 ML | Refills: 1 | Status: SHIPPED | OUTPATIENT
Start: 2018-01-01

## 2018-01-01 RX ORDER — TRAMADOL HYDROCHLORIDE 50 MG/1
50 TABLET ORAL
COMMUNITY
Start: 2017-01-01

## 2018-01-01 RX ORDER — OXYCODONE HYDROCHLORIDE 5 MG/1
5 TABLET ORAL EVERY 4 HOURS PRN
Qty: 90 TABLET | Refills: 0 | Status: SHIPPED | OUTPATIENT
Start: 2018-01-01

## 2018-01-01 RX ORDER — LEVOFLOXACIN 500 MG/1
500 TABLET, FILM COATED ORAL ONCE
Status: COMPLETED | OUTPATIENT
Start: 2018-01-01 | End: 2018-01-01

## 2018-01-01 RX ORDER — SODIUM CHLORIDE AND POTASSIUM CHLORIDE 150; 900 MG/100ML; MG/100ML
INJECTION, SOLUTION INTRAVENOUS CONTINUOUS
Status: DISCONTINUED | OUTPATIENT
Start: 2018-01-01 | End: 2018-01-01 | Stop reason: HOSPADM

## 2018-01-01 RX ORDER — PREDNISONE 10 MG/1
TABLET ORAL
Qty: 38 TABLET | Refills: 0 | Status: SHIPPED | OUTPATIENT
Start: 2018-01-01 | End: 2018-01-01

## 2018-01-01 RX ORDER — PREDNISONE 10 MG/1
TABLET ORAL
Qty: 38 TABLET | Refills: 0 | Status: SHIPPED | OUTPATIENT
Start: 2018-01-01

## 2018-01-01 RX ORDER — CITALOPRAM HYDROBROMIDE 10 MG/1
10 TABLET ORAL
COMMUNITY
Start: 2017-01-01 | End: 2018-01-01

## 2018-01-01 RX ORDER — WARFARIN SODIUM 2.5 MG/1
TABLET ORAL
COMMUNITY
Start: 2017-04-04 | End: 2018-01-01

## 2018-01-01 RX ORDER — PHYTONADIONE 5 MG/1
5 TABLET ORAL ONCE
Status: COMPLETED | OUTPATIENT
Start: 2018-01-01 | End: 2018-01-01

## 2018-01-01 RX ORDER — OXYCODONE HYDROCHLORIDE 5 MG/1
5 TABLET ORAL EVERY 4 HOURS PRN
Qty: 30 TABLET | Refills: 0 | Status: SHIPPED | OUTPATIENT
Start: 2018-01-01 | End: 2018-01-01

## 2018-01-01 RX ORDER — TRIAMCINOLONE ACETONIDE 1 MG/G
OINTMENT TOPICAL
Refills: 1 | COMMUNITY
Start: 2018-01-01

## 2018-01-01 RX ORDER — SIMVASTATIN 40 MG
40 TABLET ORAL
COMMUNITY
Start: 2017-01-01 | End: 2018-01-01

## 2018-01-01 RX ORDER — LEVOFLOXACIN 500 MG/1
500 TABLET, FILM COATED ORAL DAILY
Qty: 10 TABLET | Refills: 0 | Status: SHIPPED | OUTPATIENT
Start: 2018-01-01 | End: 2018-01-01

## 2018-01-01 RX ORDER — CETIRIZINE HYDROCHLORIDE 10 MG/1
10 TABLET ORAL
COMMUNITY
Start: 2018-01-01 | End: 2018-01-01

## 2018-01-01 RX ORDER — METRONIDAZOLE 7.5 MG/G
GEL TOPICAL
COMMUNITY
Start: 2016-09-09

## 2018-01-01 RX ORDER — CYPROHEPTADINE HYDROCHLORIDE 4 MG/1
4 TABLET ORAL 3 TIMES DAILY PRN
Qty: 30 TABLET | Refills: 0 | Status: SHIPPED | OUTPATIENT
Start: 2018-01-01 | End: 2018-01-01

## 2018-01-01 RX ORDER — CYPROHEPTADINE HYDROCHLORIDE 4 MG/1
4 TABLET ORAL 3 TIMES DAILY PRN
Qty: 30 TABLET | Refills: 3 | Status: SHIPPED | OUTPATIENT
Start: 2018-01-01

## 2018-01-01 RX ORDER — HYDROXYZINE HYDROCHLORIDE 25 MG/1
25 TABLET, FILM COATED ORAL EVERY 8 HOURS PRN
Qty: 120 TABLET | Refills: 3 | Status: SHIPPED | OUTPATIENT
Start: 2018-01-01

## 2018-01-01 RX ORDER — AZITHROMYCIN 250 MG/1
TABLET, FILM COATED ORAL
Qty: 6 TABLET | Refills: 0 | Status: SHIPPED | OUTPATIENT
Start: 2018-01-01

## 2018-01-01 RX ORDER — NITROGLYCERIN 0.3 MG/1
0.3 TABLET SUBLINGUAL
COMMUNITY

## 2018-01-01 RX ORDER — NYSTATIN 100000 U/G
CREAM TOPICAL
COMMUNITY

## 2018-01-01 RX ORDER — CYPROHEPTADINE HYDROCHLORIDE 4 MG/1
4 TABLET ORAL ONCE
Status: COMPLETED | OUTPATIENT
Start: 2018-01-01 | End: 2018-01-01

## 2018-01-01 RX ADMIN — CYPROHEPTADINE HYDROCHLORIDE 4 MG: 4 TABLET ORAL at 20:10

## 2018-01-01 RX ADMIN — IOHEXOL 100 ML: 350 INJECTION, SOLUTION INTRAVENOUS at 13:53

## 2018-01-01 RX ADMIN — PHYTONADIONE 5 MG: 5 TABLET ORAL at 23:26

## 2018-01-01 RX ADMIN — POTASSIUM CHLORIDE AND SODIUM CHLORIDE: 900; 150 INJECTION, SOLUTION INTRAVENOUS at 22:15

## 2018-01-01 RX ADMIN — LEVOFLOXACIN 500 MG: 250 TABLET, FILM COATED ORAL at 23:01

## 2018-01-01 RX ADMIN — IODIXANOL 100 ML: 320 INJECTION, SOLUTION INTRAVASCULAR at 22:00

## 2018-01-01 ASSESSMENT — ENCOUNTER SYMPTOMS
VOMITING: 0
SHORTNESS OF BREATH: 0
DIAPHORESIS: 0
EYE REDNESS: 0
EYES NEGATIVE: 1
HEMATURIA: 0
BACK PAIN: 1
FATIGUE: 0
NECK PAIN: 0
CONSTITUTIONAL NEGATIVE: 1
COUGH: 1
FEVER: 0
JOINT SWELLING: 0
AGITATION: 0
NEUROLOGICAL NEGATIVE: 1
DIARRHEA: 0
CONFUSION: 0
DIZZINESS: 0
BRUISES/BLEEDS EASILY: 1
COUGH: 1
PSYCHIATRIC NEGATIVE: 1
MUSCULOSKELETAL NEGATIVE: 1
UNEXPECTED WEIGHT CHANGE: 1
ABDOMINAL PAIN: 0
CONSTIPATION: 0
GASTROINTESTINAL NEGATIVE: 1
CARDIOVASCULAR NEGATIVE: 1
BRUISES/BLEEDS EASILY: 1
CHILLS: 0
SHORTNESS OF BREATH: 0
LIGHT-HEADEDNESS: 0
HEMATOCHEZIA: 0
NAUSEA: 0
BACK PAIN: 0
SORE THROAT: 0
WHEEZING: 0
POLYDIPSIA: 0
DYSURIA: 0
EYE DISCHARGE: 0
HEADACHES: 0
SPUTUM PRODUCTION: 0
PALPITATIONS: 0
HEMOPTYSIS: 0

## 2018-01-01 ASSESSMENT — PATIENT HEALTH QUESTIONNAIRE - PHQ9
SUM OF ALL RESPONSES TO PHQ QUESTIONS 1-9: 0

## 2018-01-01 ASSESSMENT — PAIN SCALES - GENERAL
PAINLEVEL: NO PAIN (0)
PAINLEVEL: SEVERE PAIN (6)
PAINLEVEL: SEVERE PAIN (6)

## 2018-01-02 NOTE — TELEPHONE ENCOUNTER
Patient Information     Patient Name MRN Sex Khoi Blanca [id number] Male 3/29/1932      Telephone Encounter by Tre Eagle RN at 2017  1:56 PM     Author:  Tre Eagle RN Service:  (none) Author Type:  NURS- Registered Nurse     Filed:  2017  2:06 PM Encounter Date:  2017 Status:  Signed     :  Tre Eagle RN (NURS- Registered Nurse)            Statins    Office visit in the past 12 months.    Last visit with CATHERINE FLOWER was on: 2016 in [facility]  Next visit with CATHERINE FLOWER is on: No future appointment listed with this provider  Next visit with Family Practice is on: No future appointment listed in this department    Last Lipids:  Chol: 156    2015  T    2015  HDL:   45    2015  LDL:  79    2015  LDL DIRECT:  No results found in past 5 years    .    Max refills 12 months from last office visit.      Patient is due for medication management appointment. Limited refill provided at this time and letter sent for reminder to patient. Prescription refilled per RN Medication Refill Policy.................... TRE EAGLE RN ....................  2017   2:04 PM

## 2018-01-02 NOTE — PATIENT INSTRUCTIONS
Patient Information     Patient Name MRN Khoi Dinh [id number] Male 3/29/1932      Patient Instructions by Jazmin Montiel RN at 2017 10:45 AM     Author:  Jazmin Montiel RN Service:  (none) Author Type:  NURS- Registered Nurse     Filed:  2017 10:41 AM Encounter Date:  2017 Status:  Signed     :  Jazmin Montiel RN (NURS- Registered Nurse)            2017 Details    Sun Mon Tue Wed Thu Fri Sat     1               2               3               4               5               6               7                 8               9               10               11      2.5 mg   See details      12      5 mg         13      2.5 mg         14      2.5 mg           15      2.5 mg         16      2.5 mg         17      2.5 mg         18      2.5 mg         19      5 mg         20      2.5 mg         21      2.5 mg           22      2.5 mg         23      2.5 mg         24      2.5 mg         25      2.5 mg         26      5 mg         27      2.5 mg         28      2.5 mg           29      2.5 mg         30      2.5 mg         31      2.5 mg              Date Details    This INR check               How to take your warfarin dose     To take:  2.5 mg Take one of the 2.5 mg tablets.    To take:  5 mg Take two of the 2.5 mg tablets.           2017 Details    Sun Mon Tue Wed Thu Fri Sat        1      2.5 mg         2      5 mg         3      2.5 mg         4      2.5 mg           5      2.5 mg         6      2.5 mg         7      2.5 mg         8      2.5 mg         9               10               11                 12               13               14               15               16               17               18                 19               20               21               22               23               24               25                 26               27               28                    Date Details   No additional details    Date of next  INR:  2/8/2017         How to take your warfarin dose     To take:  2.5 mg Take one of the 2.5 mg tablets.    To take:  5 mg Take two of the 2.5 mg tablets.             Description          Continue same Warfarin dose and recheck in 1 month.  Jazmin Montiel RN   1/11/2017    10:40 AM                                      Anticoagulation Summary as of 1/11/2017     INR goal 2.0-3.0    Today's INR 1.9    Next INR check 2/8/2017          Call your Anticoagulation Clinic at Dept: [phone]   if:   1. Any medications are started, stopped, or there is a change in dose.  2. You experience any bleeding that is not easily stopped or if it is recurrent.  3. You notice an increase in bruising or any bruising that does not heal.  You are scheduled for surgery, colonoscopy, dental extraction or any other procedure where you may need to stop your Coumadin (warfarin).

## 2018-01-03 NOTE — TELEPHONE ENCOUNTER
Patient Information     Patient Name MRN Sex Khoi Blanca [id number] Male 3/29/1932      Telephone Encounter by Keeley Mirza at 3/3/2017  2:13 PM     Author:  Keeley Mirza Service:  (none) Author Type:  (none)     Filed:  3/3/2017  2:14 PM Encounter Date:  3/3/2017 Status:  Signed     :  Keeley Mirza            Insurance well not pay for Humulin 70/30 insulin, 40 units at breakfast, do a PA or different medication  Keeley Mirza ....................  3/3/2017   2:14 PM

## 2018-01-03 NOTE — TELEPHONE ENCOUNTER
Patient Information     Patient Name MRN Khoi Dinh [id number] Male 3/29/1932      Telephone Encounter by Stephen Morataya MD at 3/3/2017  2:41 PM     Author:  Stephen Morataya MD Service:  (none) Author Type:  Physician     Filed:  3/3/2017  2:42 PM Encounter Date:  3/3/2017 Status:  Signed     :  Stephen Morataya MD (Physician)            Call the patient and see how much he has left at home.  If he has 3 months worth, then we can wait for his next follow up visit and change, otherwise let him know it will be 2 different insulins, one at bedtime and one three times daily with meals.  Stephen Morataya MD ....................  3/3/2017   2:42 PM

## 2018-01-03 NOTE — TELEPHONE ENCOUNTER
Patient Information     Patient Name MRN Khoi Dinh [id number] Male 3/29/1932      Telephone Encounter by Keeley Mirza at 3/3/2017  2:57 PM     Author:  Keeley Mirza Service:  (none) Author Type:  (none)     Filed:  3/3/2017  2:58 PM Encounter Date:  3/3/2017 Status:  Signed     :  Keeley Mirza            [facility]   Keeley Mirza ....................  3/3/2017   2:58 PM

## 2018-01-03 NOTE — PATIENT INSTRUCTIONS
Patient Information     Patient Name MRN Khoi Dinh [id number] Male 3/29/1932      Patient Instructions by Jazmin Montiel RN at 2017 10:45 AM     Author:  Jazmin Montiel RN Service:  (none) Author Type:  NURS- Registered Nurse     Filed:  2017 10:43 AM Encounter Date:  2017 Status:  Signed     :  Jazmin Montiel RN (NURS- Registered Nurse)            2017 Details    Sun Mon Tue Wed Thu Fri Sat        1               2               3               4                 5               6               7               8      2.5 mg   See details      9      5 mg         10      2.5 mg         11      2.5 mg           12      2.5 mg         13      2.5 mg         14      2.5 mg         15      2.5 mg         16      5 mg         17      2.5 mg         18      2.5 mg           19      2.5 mg         20      2.5 mg         21      2.5 mg         22      2.5 mg         23      5 mg         24      2.5 mg         25      2.5 mg           26      2.5 mg         27      2.5 mg         28      2.5 mg              Date Details    This INR check               How to take your warfarin dose     To take:  2.5 mg Take one of the 2.5 mg tablets.    To take:  5 mg Take two of the 2.5 mg tablets.           2017 Details    Sun Mon Tue Wed Thu Fri Sat        1      2.5 mg         2      5 mg         3      2.5 mg         4      2.5 mg           5      2.5 mg         6      2.5 mg         7      2.5 mg         8      2.5 mg         9      5 mg         10      2.5 mg         11      2.5 mg           12      2.5 mg         13      2.5 mg         14      2.5 mg         15      2.5 mg         16      5 mg         17      2.5 mg         18      2.5 mg           19      2.5 mg         20      2.5 mg         21      2.5 mg         22      2.5 mg         23      5 mg         24      2.5 mg         25      2.5 mg           26      2.5 mg         27      2.5 mg         28      2.5 mg          29      2.5 mg         30      5 mg         31      2.5 mg           Date Details   No additional details            How to take your warfarin dose     To take:  2.5 mg Take one of the 2.5 mg tablets.    To take:  5 mg Take two of the 2.5 mg tablets.           April 2017 Details    Sun Mon Tue Wed Thu Fri Sat           1      2.5 mg           2      2.5 mg         3      2.5 mg         4      2.5 mg         5      2.5 mg         6      5 mg         7      2.5 mg         8      2.5 mg           9      2.5 mg         10      2.5 mg         11      2.5 mg         12      2.5 mg         13      5 mg         14      2.5 mg         15      2.5 mg           16      2.5 mg         17      2.5 mg         18      2.5 mg         19      2.5 mg         20      5 mg         21      2.5 mg         22      2.5 mg           23      2.5 mg         24      2.5 mg         25      2.5 mg         26      2.5 mg         27      5 mg         28      2.5 mg         29      2.5 mg           30      2.5 mg                Date Details   No additional details            How to take your warfarin dose     To take:  2.5 mg Take one of the 2.5 mg tablets.    To take:  5 mg Take two of the 2.5 mg tablets.           May 2017 Details    Sun Mon Tue Wed Thu Fri Sat      1      2.5 mg         2      2.5 mg         3      2.5 mg         4               5               6                 7               8               9               10               11               12               13                 14               15               16               17               18               19               20                 21               22               23               24               25               26               27                 28               29               30               31                   Date Details   No additional details    Date of next INR:  5/3/2017         How to take your warfarin dose     To take:  2.5 mg Take one of the  2.5 mg tablets.             Description          Continue same Warfarin dose and recheck in 12 weeks  Jazmin Montiel RN    2/8/2017  10:43 AM                                      Anticoagulation Summary as of 2/8/2017     INR goal 2.0-3.0    Today's INR 2.4    Next INR check 5/3/2017          Call your Anticoagulation Clinic at Dept: [phone]   if:   1. Any medications are started, stopped, or there is a change in dose.  2. You experience any bleeding that is not easily stopped or if it is recurrent.  3. You notice an increase in bruising or any bruising that does not heal.  You are scheduled for surgery, colonoscopy, dental extraction or any other procedure where you may need to stop your Coumadin (warfarin).

## 2018-01-03 NOTE — PROGRESS NOTES
"Patient Information     Patient Name MRN Sex Khoi Blanca [id number] Male 3/29/1932      Progress Notes by Stephen Morataya MD at 3/3/2017  1:45 PM     Author:  Stephen Morataya MD Service:  (none) Author Type:  Physician     Filed:  3/3/2017  1:48 PM Encounter Date:  3/3/2017 Status:  Signed     :  Stephen Morataya MD (Physician)            SUBJECTIVE:    Khoi Rodriguez is a 84 y.o. male who presents for right eye vision loss    HPI    He reports a history of a retinal tear, but also had a \"leaky blood vessel\".  He has been seeing a specialist out of [place], Dr. Griggs.  Has had periodic check ups, perhaps 6 or so per his memory.  2 visits ago he was told to just follow up every 6 months.  4 days later, woke up with loss of vision.  Called Dr. Griggs's office, was sent to Dr. Adams.  Was immediately sent back to [place], and was treated with an injection which has not helped him at all.  He is seeing \"floaters\" in the periphery.  Has a follow up there next Monday at 8:55.    I reviewed the last note, from 10/16, from them.  Has proliferative retinopathy from his diabetes.  Then, his retinal tear was healed.     His glucose is checked twice daily and is nearly always in the 120's.    No Known Allergies,   Current Outpatient Prescriptions on File Prior to Visit       Medication  Sig Dispense Refill     aspirin 81 mg tablet Take 81 mg by mouth once daily with a meal.       gabapentin (NEURONTIN) 300 mg capsule TAKE 1 CAPSULE BY MOUTH THREE TIMES DAILY. 270 capsule 0     ketoconazole 2% topical (NIZORAL) cream Apply  topically to affected area(s) 2 times daily. 1 Tube 3     melatonin 10 mg tab Take  by mouth.  0     metroNIDAZOLE (METROGEL) 0.75 % gel Apply  topically to affected area(s) 2 times daily. 45 g 11     nitroglycerin (NITROSTAT) 0.3 mg SL tablet Place 0.3 mg under the tongue every 5 minutes if needed.       nystatin (MYCOSTATIN) cream Apply  topically to affected area(s) 2 times daily. To " rash       simvastatin (ZOCOR) 40 mg tablet TAKE 1 TABLET BY MOUTH AT BEDTIME 90 tablet 0     warfarin (COUMADIN) 2.5 mg tablet TAKE ONE TABLET BY MOUTH SIX DAYS A WEEK AND THEN TWO TABLETS BY MOUTH ONE DAY PER WEEK 180 tablet 0     No current facility-administered medications on file prior to visit.    ,   Past Medical History      Diagnosis   Date     Abnormal cardiac CT angiography       Occlusion of 2 obtuse marginal stents,70% mid LAD  lesion stented.      Atrial fibrillation (HC)       Atrial fibrillation (HC)       Closed compression fracture of thoracic vertebra (HC)  10/22/2015     Incidental finding on CXR 10/21/15--T8-9--new from CXR 2009      Coronary artery stenting  06/2006     Coronary heart disease       Coronary heart disease       Diabetes mellitus, type 2 (HC)       Diabetes mellitus, type 2 (HC)       Hx of left cataract extraction       LAD stenosis  07/06     Followup angiography showed occlusion of the two obtuse marginal stents, 70% mid LAD lesion which was stented.        S/P coronary artery stent placement  2006     Arthrectomy      S/P laparoscopic cholecystectomy  05/01     S/P placement of cardiac pacemaker  2002    and   Past Surgical History       Procedure   Laterality Date     Lap cholecystectomy   2001     Cataract removal        Left eye         REVIEW OF SYSTEMS:  Review of Systems   Constitutional: Negative for chills and fever.   Eyes: Positive for blurred vision. Negative for photophobia, pain, discharge and redness.   Cardiovascular: Negative for chest pain.       OBJECTIVE:  /76  Resp 16  Wt 89.2 kg (196 lb 9.6 oz)  BMI 29.25 kg/m2    EXAM:   Physical Exam   Constitutional: He is oriented to person, place, and time and well-developed, well-nourished, and in no distress. No distress.   Neurological: He is alert and oriented to person, place, and time.   Skin: He is not diaphoretic.   Psychiatric: Memory, affect and judgment normal.       ASSESSMENT/PLAN:    ICD-10-CM     1. 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  Discussed with him the ongoing laser treatment, the reasons this happens and also expectations that we are not likelyto get him full vision back.  His A1cs have all been under 7.5, but perhaps we need to try to get them lower still.  Will have him increase the insulin to 40 units twice daily.     Stephen Morataya MD ....................  3/3/2017   1:47 PM

## 2018-01-03 NOTE — TELEPHONE ENCOUNTER
Patient Information     Patient Name MRN Sex Khoi Blanca [id number] Male 3/29/1932      Telephone Encounter by Radha Davis at 2/10/2017 10:47 AM     Author:  Radha Davis Service:  (none) Author Type:  NURS- Registered Nurse     Filed:  2/10/2017 10:51 AM Encounter Date:  2/10/2017 Status:  Signed     :  Radha Davis (NURS- Registered Nurse)            Nsaids    Office visit in the past 12 months or per provider note.    Last visit with CATHERINE FLOWER was on: 2016 in [facility]  Next visit with CATHERINE FLOWER is on: No future appointment listed with this provider  Next visit with Family Practice is on: No future appointment listed in this department    Max refill for 12 months from last office visit or per provider note.    Patient is due for medication management appointment. Limited refill provided at this time and letter sent for reminder to patient. Prescription refilled per RN Medication Refill Policy.................... Radha Davis RN ....................  2/10/2017   10:50 AM

## 2018-01-04 NOTE — NURSING NOTE
Patient Information     Patient Name MRN Sex Khoi Blanca [id number] Male 3/29/1932      Nursing Note by Keeley Mirza at 2017 10:15 AM     Author:  Keeley Mirza Service:  (none) Author Type:  (none)     Filed:  2017 10:07 AM Encounter Date:  2017 Status:  Signed     :  Keeley Mirza            Coming in for a diabetic check and check Rt eye, was in [place] and had another shot in the eye, can see somewhat better.  Keeley Mirza ....................  2017   9:54 AM

## 2018-01-04 NOTE — TELEPHONE ENCOUNTER
Patient Information     Patient Name MRN Khoi Dinh [id number] Male 3/29/1932      Telephone Encounter by Keeley Mirza at 2017  3:38 PM     Author:  Keeley Mirza Service:  (none) Author Type:  (none)     Filed:  2017  3:39 PM Encounter Date:  2017 Status:  Signed     :  Keeley Mirza            Called Pt and he said he is taking the medication 40 mg once daily, told him to change it to 20 mg twice daily per TJP  Keeley Mirza ....................  2017   3:39 PM

## 2018-01-04 NOTE — PATIENT INSTRUCTIONS
Patient Information     Patient Name MRN Khoi Dinh [id number] Male 3/29/1932      Patient Instructions by Jazmin Montiel RN at 5/3/2017 10:45 AM     Author:  Jazmin Montiel RN Service:  (none) Author Type:  NURS- Registered Nurse     Filed:  5/3/2017 10:47 AM Encounter Date:  5/3/2017 Status:  Signed     :  Jazmin Montiel RN (NURS- Registered Nurse)            May 2017 Details    Sun  Fri Sat      1               2               3      2.5 mg   See details      4      5 mg         5      2.5 mg         6      2.5 mg           7      2.5 mg         8      2.5 mg         9      2.5 mg         10      2.5 mg         11      5 mg         12      2.5 mg         13      2.5 mg           14      2.5 mg         15      2.5 mg         16      2.5 mg         17      2.5 mg         18      5 mg         19      2.5 mg         20      2.5 mg           21      2.5 mg         22      2.5 mg         23      2.5 mg         24      2.5 mg         25      5 mg         26      2.5 mg         27      2.5 mg           28      2.5 mg         29      2.5 mg         30      2.5 mg         31      2.5 mg             Date Details    This INR check               How to take your warfarin dose     To take:  2.5 mg Take one of the 2.5 mg tablets.    To take:  5 mg Take two of the 2.5 mg tablets.           2017 Details    Sun u Fri Sat         1      5 mg         2      2.5 mg         3      2.5 mg           4      2.5 mg         5      2.5 mg         6      2.5 mg         7      2.5 mg         8      5 mg         9      2.5 mg         10      2.5 mg           11      2.5 mg         12      2.5 mg         13      2.5 mg         14      2.5 mg         15      5 mg         16      2.5 mg         17      2.5 mg           18      2.5 mg         19      2.5 mg         20      2.5 mg         21      2.5 mg         22      5 mg         23      2.5 mg         24      2.5 mg           25       2.5 mg         26      2.5 mg         27      2.5 mg         28      2.5 mg         29      5 mg         30      2.5 mg           Date Details   No additional details            How to take your warfarin dose     To take:  2.5 mg Take one of the 2.5 mg tablets.    To take:  5 mg Take two of the 2.5 mg tablets.           July 2017 Details    Sun Mon Tue Wed Thu Fri Sat           1      2.5 mg           2      2.5 mg         3      2.5 mg         4      2.5 mg         5      2.5 mg         6      5 mg         7      2.5 mg         8      2.5 mg           9      2.5 mg         10      2.5 mg         11      2.5 mg         12      2.5 mg         13      5 mg         14      2.5 mg         15      2.5 mg           16      2.5 mg         17      2.5 mg         18      2.5 mg         19      2.5 mg         20      5 mg         21      2.5 mg         22      2.5 mg           23      2.5 mg         24      2.5 mg         25      2.5 mg         26      2.5 mg         27               28               29                 30               31                     Date Details   No additional details    Date of next INR:  7/26/2017         How to take your warfarin dose     To take:  2.5 mg Take one of the 2.5 mg tablets.    To take:  5 mg Take two of the 2.5 mg tablets.             Description          Continue same Warfarin dose and recheck in 12 weeks  Jazmin Montiel RN    5/3/2017  10:47 AM                                    Anticoagulation Summary as of 5/3/2017     INR goal 2.0-3.0    Today's INR 2.6    Next INR check 7/26/2017          Call your Anticoagulation Clinic at Dept: [phone]   if:   1. Any medications are started, stopped, or there is a change in dose.  2. You experience any bleeding that is not easily stopped or if it is recurrent.  3. You notice an increase in bruising or any bruising that does not heal.  4. You are scheduled for surgery, colonoscopy, dental extraction or any other procedure where you  may need to stop your Coumadin (warfarin).

## 2018-01-04 NOTE — PROGRESS NOTES
Patient Information     Patient Name MRN Sex Khoi Blanca [id number] Male 3/29/1932      Progress Notes by Stephen Morataya MD at 2017 10:09 AM     Author:  Stephen Morataya MD Service:  (none) Author Type:  Physician     Filed:  2017 11:50 AM Encounter Date:  2017 Status:  Signed     :  Stephen Morataya MD (Physician)              SUBJECTIVE:  Khoi Rodriguez is an 85 y.o. male who presents for diabetes follow-up.      The patient reports the following concerns: had a follow up in [place] on his eye yesterday.  Was told it is improving.  He notes an improvement, about 50%.  [facility], A1c was down to 6.8%.  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  stops to resident due to back pain. Needs refills on many medications today.  Patient denies any new symptoms, polyuria, polydipsia, chest pain, shortness of breath and weight loss  Patient brought in home blood glucose values today? no  Home Blood glucose tests are performed regularly and values range  or so.   Histories reviewed today include: Past Medical History, Past Surgical History and Social History  Diabetic Lifestyle Progress: continues to follow up for regular visits, continues to follow a diabetic diet and continues to exercise    HEMOGLOBIN A1C MONITORING (POCT)      Date Value Ref Range Status   2016 7.4 (H) 4.0 - 6.2 % Final     Lab Results      Component  Value Date/Time    CHOL 156 2015 12:11 PM    HDL 45 2015 12:11 PM    LDLCHOL 79 2015 12:11 PM    TRIGLYCERIDE 162 (H) 2015 12:11 PM     Lab Results      Component  Value Date/Time    MICROALBRAND 7.8 2016 12:17 PM     Lab Results      Component  Value Date/Time    CREATININE 0.93 10/21/2013 12:59 PM       Current Medications:  Current Outpatient Rx       Medication  Sig Dispense Refill     aspirin 81 mg tablet Take 81 mg by mouth once daily with a meal.       gabapentin (NEURONTIN) 300 mg capsule Take  1 capsule by mouth 3 times daily. 270 capsule 3     insulin nph-regular (HUMULIN 70/30) 100 unit/mL (70-30) injection Inject 40 Units subcutaneous before breakfast. 10 mL 0     ketoconazole 2% topical (NIZORAL) cream Apply  topically to affected area(s) 2 times daily. 1 Tube 3     melatonin 10 mg tab Take  by mouth.  0     metroNIDAZOLE (METROGEL) 0.75 % gel Apply  topically to affected area(s) 2 times daily. 45 g 11     nitroglycerin (NITROSTAT) 0.3 mg SL tablet Place 0.3 mg under the tongue every 5 minutes if needed.       nystatin (MYCOSTATIN) cream Apply  topically to affected area(s) 2 times daily. To rash       simvastatin (ZOCOR) 40 mg tablet Take 1 tablet by mouth at bedtime. 90 tablet 0     warfarin (COUMADIN) 2.5 mg tablet TAKE ONE TABLET BY MOUTH SIX DAYS A WEEK AND THEN TWO TABLETS BY MOUTH ONE DAY PER WEEK 180 tablet 3     Medications have been reviewed by me and are current to the best of my knowledge and ability.       Allergies: Review of patient's allergies indicates no known allergies.     OBJECTIVE:  /70  Resp 14  Wt 90.7 kg (200 lb)  BMI 29.76 kg/m2  General Appearance: Pleasant, alert, appropriate appearance for age. No acute distress   Eye: Normal.  Heart:rrr, no murmur or rubs.  In normal sinus rhythm on exam.    Lung: Clear to auscultation, no wheezing, crackles or rhonchi.  No increased work of breathing.  Feet: Left and right foot: monofilament sensation mildly abnormal, good pedal pulses, no lesions, nail hygiene good.     ASSESSMENT/PLAN:  (E11.3599) Proliferative diabetic retinopathy without macular edema associated with type 2 diabetes mellitus (HC)  (primary encounter diagnosis)  Comment: improving  Plan: insulin nph-regular (HUMULIN 70/30) 100 unit/mL        (70-30) injection        Recent labs show a drop in his A1c, has had no lows.  Follow up in 3 months.    (I48.91) Atrial fibrillation, unspecified type  Comment: normal sinus rhythm currently on exam  Plan: warfarin  (COUMADIN) 2.5 mg tablet        Refilled this.    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        Stephen Morataya MD ....................  4/4/2017   10:15 AM

## 2018-01-04 NOTE — TELEPHONE ENCOUNTER
Patient Information     Patient Name MRN Khoi Dinh [id number] Male 3/29/1932      Telephone Encounter by Keeley Mirza at 2017  2:35 PM     Author:  Keeley Mirza Service:  (none) Author Type:  (none)     Filed:  2017  2:37 PM Encounter Date:  2017 Status:  Signed     :  Keeley Mirza            Humulin 70/30 is not covered by Insurance but Novolin 70/30 is, could the pharmacy get an Rx for this  Keeley Mirza ....................  2017   2:37 PM

## 2018-01-05 NOTE — PROGRESS NOTES
Patient Information     Patient Name MRN Sex Khoi Blanca [id number] Male 3/29/1932      Progress Notes by Stephen Morataya MD at 2017  8:30 AM     Author:  Stephen Morataya MD Service:  (none) Author Type:  Physician     Filed:  2017  8:48 AM Encounter Date:  2017 Status:  Signed     :  Stephen Morataya MD (Physician)            SUBJECTIVE:    Khoi Rodriguez is a 85 y.o. male who presents for right facial lesion    HPI    Has had this for many years.  Was biopsied in the past, not cancer at that time per patient.  Has been getting cryo to it periodically, and this had helped keep it in check.  About 2 weeks ago it came back.  Itching a little, mostly burning type pain.  Worse after washing his face.    No Known Allergies,   Current Outpatient Prescriptions on File Prior to Visit       Medication  Sig Dispense Refill     aspirin 81 mg tablet Take 81 mg by mouth once daily with a meal.       gabapentin (NEURONTIN) 300 mg capsule Take 1 capsule by mouth 3 times daily. 270 capsule 3     insulin aspart protamine-insulin aspart (NOVOLOG MIX 70-30) 100 unit/mL (70-30) FLEXPEN Inject 40 Units subcutaneous 2 times daily before meals. 100 mL 3     ketoconazole 2% topical (NIZORAL) cream Apply  topically to affected area(s) 2 times daily. 1 Tube 3     melatonin 10 mg tab Take  by mouth.  0     metroNIDAZOLE (METROGEL) 0.75 % gel Apply  topically to affected area(s) 2 times daily. 45 g 11     nitroglycerin (NITROSTAT) 0.3 mg SL tablet Place 0.3 mg under the tongue every 5 minutes if needed.       nystatin (MYCOSTATIN) cream Apply  topically to affected area(s) 2 times daily. To rash       simvastatin (ZOCOR) 40 mg tablet Take 1 tablet by mouth at bedtime. 90 tablet 0     warfarin (COUMADIN) 2.5 mg tablet TAKE ONE TABLET BY MOUTH SIX DAYS A WEEK AND THEN TWO TABLETS BY MOUTH ONE DAY PER WEEK 180 tablet 3     No current facility-administered medications on file prior to visit.    ,   Past Medical  History:     Diagnosis  Date     Abnormal cardiac CT angiography     Occlusion of 2 obtuse marginal stents,70% mid LAD  lesion stented.      Atrial fibrillation (HC)      Atrial fibrillation (HC)      Closed compression fracture of thoracic vertebra (HC) 10/22/2015    Incidental finding on CXR 10/21/15--T8-9--new from CXR 2009      Coronary artery stenting 06/2006     Coronary heart disease      Coronary heart disease      Diabetes mellitus, type 2 (HC)      Diabetes mellitus, type 2 (HC)      Hx of left cataract extraction      LAD stenosis 07/06    Followup angiography showed occlusion of the two obtuse marginal stents, 70% mid LAD lesion which was stented.        S/P coronary artery stent placement 2006    Arthrectomy      S/P laparoscopic cholecystectomy 05/01     S/P placement of cardiac pacemaker 2002    and   Past Surgical History:      Procedure  Laterality Date     CATARACT REMOVAL      Left eye       LAP CHOLECYSTECTOMY  2001       REVIEW OF SYSTEMS:  Review of Systems   Constitutional: Negative for chills and fever.   Musculoskeletal: Positive for joint pain.   Skin: Positive for itching and rash.       OBJECTIVE:  /68  Resp 16  Wt 89.4 kg (197 lb)  BMI 29.31 kg/m2    EXAM:   Physical Exam   Constitutional: He is oriented to person, place, and time and well-developed, well-nourished, and in no distress. No distress.   Neurological: He is alert and oriented to person, place, and time.   Skin: He is not diaphoretic.   Right malar eminence with a pink rough patch, about 1.5 cm in diameter.  Small, 2 mm, central ulceration, excoriated.       ASSESSMENT/PLAN:    ICD-10-CM    1. Actinic keratosis L57.0 VA DESTROY PREMALIGNANT 1ST LESION        Plan:  I was able to find the reports on the biopsy.  This was in 2012 and was actinic keratosis.  Of course we need to consider it has changed into a squamous cell carcinoma.  Clinically, is still consistent with actinic keratosis.  I offered him a surgical  consult for definitive excision.  Patient would like to simply do the cryo again, and this is reasonable as well, especially given advanced age.  Will have him show this to me at next follow up visit or call me in a few weeks if he wants to see a surgeon.    Lesion was therefore treated with 3 cycles of cryo.  Tolerated well.  This is not curative and really intended to slow the growth.    Stephen Morataya MD ....................  5/19/2017   8:45 AM

## 2018-01-05 NOTE — NURSING NOTE
Patient Information     Patient Name MRN Sex Khoi Blanca [id number] Male 3/29/1932      Nursing Note by Keeley Mirza at 2017  8:30 AM     Author:  Keeley Mirza Service:  (none) Author Type:  (none)     Filed:  2017  8:33 AM Encounter Date:  2017 Status:  Signed     :  Keeley Mirza            Coming in for a spot on the Rt side of his face.  Keeley Mirza ....................  2017   8:25 AM

## 2018-01-16 PROBLEM — I25.10 CORONARY ATHEROSCLEROSIS: Status: ACTIVE | Noted: 2018-01-01

## 2018-01-16 PROBLEM — E11.3599 PROLIFERATIVE DIABETIC RETINOPATHY WITHOUT MACULAR EDEMA ASSOCIATED WITH TYPE 2 DIABETES MELLITUS (H): Status: ACTIVE | Noted: 2017-03-03

## 2018-01-16 PROBLEM — I48.91 ATRIAL FIBRILLATION (H): Status: ACTIVE | Noted: 2018-01-01

## 2018-01-16 PROBLEM — E78.00 HYPERCHOLESTEROLEMIA: Status: ACTIVE | Noted: 2018-01-01

## 2018-02-11 PROBLEM — I48.91 UNSPECIFIED ATRIAL FIBRILLATION: Status: ACTIVE | Noted: 2018-01-01

## 2018-02-12 NOTE — NURSING NOTE
Patient Information     Patient Name MRN Khoi Dinh [id number] Male 3/29/1932      Nursing Note by Juani Mckenna at 2018 11:00 AM     Author:  Juani Mckenna Service:  (none) Author Type:  (none)     Filed:  2018 10:54 AM Encounter Date:  2018 Status:  Signed     :  Juani Mckenna            Patient presents to clinic today for a diabetic check and he states he has also been itching a lot lately.    Juani Mckenna LPN...................2018  10:39 AM    Previous A1C is at goal of <8  HEMOGLOBIN A1C MONITORING (POCT)    Date Value   2017 6.0 %   2013 7.1 % NGSP (H)     Urine microalbumin:creatine: 17  Foot exam N/A  Eye exam 17    Patient is not a current smoker  Patient is on a daily aspirin  Patient is on a Statin.  Blood pressure today of 134/82 is at the goal of <139/89 for diabetics.    Juani Mckenna LPN..............2018 10:42 AM

## 2018-02-12 NOTE — PROGRESS NOTES
Patient Information     Patient Name MRN Sex Khoi Blanca [id number] Male 3/29/1932      Progress Notes by Stephen Morataya MD at 2018 10:55 AM     Author:  Stephen Morataya MD Service:  (none) Author Type:  Physician     Filed:  2018  4:32 PM Encounter Date:  2018 Status:  Signed     :  Stephen Morataya MD (Physician)              SUBJECTIVE:  Khoi Rodriguez is an 85 y.o. male who presents for diabetes follow-up and skin concerns.      The patient reports the following concerns: dry skin on back this winter.  Using lotion, helps for a few hours.    Patient denies any new symptoms, chest pain, shortness of breath, nausea or vomiting and weight loss  Patient brought in home blood glucose values today? no  Home Blood glucose tests are performed regularly and values range .   Histories reviewed today include: Past Medical History, Past Surgical History and Social History  Diabetic Lifestyle Progress: continues to follow up for regular visits and continues to follow a diabetic diet    HEMOGLOBIN A1C MONITORING (POCT)      Date Value Ref Range Status   2017 6.0 4.0 - 6.2 % Final     Lab Results      Component  Value Date/Time    CHOL 156 2015 12:11 PM    HDL 45 2015 12:11 PM    LDLCHOL 79 2015 12:11 PM    TRIGLYCERIDE 162 (H) 2015 12:11 PM     Lab Results      Component  Value Date/Time    MICROALBRAND 12.1 2017 10:35 AM     Lab Results      Component  Value Date/Time    CREATININE 0.99 2017 10:36 AM       Current Medications:  Current Outpatient Rx       Medication  Sig Dispense Refill     aspirin 81 mg tablet Take 81 mg by mouth once daily with a meal.       citalopram (CELEXA) 10 mg tablet Take 1 tablet by mouth once daily. 90 tablet 3     gabapentin (NEURONTIN) 300 mg capsule Take 1 capsule by mouth 3 times daily. 270 capsule 3     insulin aspart protamine-insulin aspart (NOVOLOG MIX 70-30) 100 unit/mL (70-30) FLEXPEN Inject 40 Units  "subcutaneous 2 times daily before meals. 100 mL 3     ketoconazole 2% topical (NIZORAL) cream Apply  topically to affected area(s) 2 times daily. 1 Tube 3     melatonin 10 mg tab Take  by mouth.  0     metroNIDAZOLE (METROGEL) 0.75 % gel Apply  topically to affected area(s) 2 times daily. 45 g 11     nitroglycerin (NITROSTAT) 0.3 mg SL tablet Place 0.3 mg under the tongue every 5 minutes if needed.       nystatin (MYCOSTATIN) cream Apply  topically to affected area(s) 2 times daily. To rash       simvastatin (ZOCOR) 40 mg tablet TAKE 1 TABLET BY MOUTH AT BEDTIME 90 tablet 2     traMADol (ULTRAM) 50 mg tablet Take 1 tablet by mouth every 6 hours if needed for Pain. 30 tablet 3     warfarin (COUMADIN) 2.5 mg tablet TAKE ONE TABLET BY MOUTH SIX DAYS A WEEK AND THEN TWO TABLETS BY MOUTH ONE DAY PER WEEK 180 tablet 3     Medications have been reviewed by me and are current to the best of my knowledge and ability.       Allergies: Review of patient's allergies indicates no known allergies.     OBJECTIVE:  /82 (Cuff Site: Right Arm, Position: Sitting, Cuff Size: Adult Regular)  Pulse 76  Ht 1.75 m (5' 8.9\")  Wt 86.7 kg (191 lb 2 oz)  BMI 28.31 kg/m2  General Appearance: Pleasant, alert, appropriate appearance for age. No acute distress   Eye: Normal.  Heart: normal; regular rate and rhythm.  S1, S2, no murmur, click, gallop, or rubs.  Lung: Clear to auscultation, no wheezing, crackles or rhonchi.  No increased work of breathing.  Feet: Left and right foot: monofilament sensation normal, good pedal pulses, no lesions, nail hygiene good.     Mild scaling on back, no specific lesions.  A few excoriations on upper back.    ASSESSMENT/PLAN:  (E11.9,  Z79.4) Controlled type 2 diabetes mellitus without complication, with long-term current use of insulin (HC)  (primary encounter diagnosis)  Comment: stable  Plan: Hgb A1c         Follow up in 6 months    (L30.9) Dermatitis  Comment: new  Plan: triamcinolone (ARISTOCORT; " KENALOG) 0.1 % cream        This is likely just from the winter and dry air combined with his age.  Continue with lotions and add on the steroid as needed.      (I48.1) Persistent atrial fibrillation (HC)  Comment: stable  Plan: CBC WITH DIFFERENTIAL        Is regular rate and rhythm on exam from paced rhythm.  CBC today due to warfarin.        Stephen Morataya MD ....................  1/2/2018   11:04 AM

## 2018-02-12 NOTE — PATIENT INSTRUCTIONS
Patient Information     Patient Name MRN Khoi Dinh [id number] Male 3/29/1932      Patient Instructions by Beryl Eagle RN at 2018 10:15 AM     Author:  Beryl Eagle RN Service:  (none) Author Type:  NURS- Registered Nurse     Filed:  2018 10:04 AM Encounter Date:  2018 Status:  Signed     :  Beryl Ealge RN (NURS- Registered Nurse)            2018 Details    Sun  Fri Sat      1               2      2.5 mg   See details      3      2.5 mg         4      5 mg         5      2.5 mg         6      2.5 mg           7      2.5 mg         8      2.5 mg         9      2.5 mg         10      2.5 mg         11      5 mg         12      2.5 mg         13      2.5 mg           14      2.5 mg         15      2.5 mg         16      2.5 mg         17      2.5 mg         18      5 mg         19      2.5 mg         20      2.5 mg           21      2.5 mg         22      2.5 mg         23      2.5 mg         24      2.5 mg         25      5 mg         26      2.5 mg         27      2.5 mg           28      2.5 mg         29      2.5 mg         30      2.5 mg         31      2.5 mg             Date Details    This INR check               How to take your warfarin dose     To take:  2.5 mg Take one of the 2.5 mg tablets.    To take:  5 mg Take two of the 2.5 mg tablets.           2018 Details    Sun  Sat         1      5 mg         2      2.5 mg         3      2.5 mg           4      2.5 mg         5      2.5 mg         6      2.5 mg         7      2.5 mg         8      5 mg         9      2.5 mg         10      2.5 mg           11      2.5 mg         12      2.5 mg         13      2.5 mg         14      2.5 mg         15      5 mg         16      2.5 mg         17      2.5 mg           18      2.5 mg         19      2.5 mg         20      2.5 mg         21      2.5 mg         22      5 mg         23      2.5 mg         24      2.5  mg           25      2.5 mg         26      2.5 mg         27      2.5 mg         28      2.5 mg             Date Details   No additional details            How to take your warfarin dose     To take:  2.5 mg Take one of the 2.5 mg tablets.    To take:  5 mg Take two of the 2.5 mg tablets.           March 2018 Details    Sun Mon Tue Wed Thu Fri Sat         1      5 mg         2      2.5 mg         3      2.5 mg           4      2.5 mg         5      2.5 mg         6      2.5 mg         7      2.5 mg         8      5 mg         9      2.5 mg         10      2.5 mg           11      2.5 mg         12      2.5 mg         13      2.5 mg         14      2.5 mg         15      5 mg         16      2.5 mg         17      2.5 mg           18      2.5 mg         19      2.5 mg         20      2.5 mg         21      2.5 mg         22      5 mg         23      2.5 mg         24      2.5 mg           25      2.5 mg         26      2.5 mg         27      2.5 mg         28               29               30               31                Date Details   No additional details    Date of next INR:  3/27/2018         How to take your warfarin dose     To take:  2.5 mg Take one of the 2.5 mg tablets.    To take:  5 mg Take two of the 2.5 mg tablets.             Description          Continue same Warfarin dose and recheck in 12 weeks  TRE VO RN ....................  1/2/2018   10:03 AM    Reiterated reasons to come have INR checked sooner                                    Anticoagulation Summary as of 1/2/2018     INR goal 2.0-3.0    Today's INR 2.3    Next INR check 3/27/2018          Call your Anticoagulation Clinic at Dept: [phone]   if:   1. Any medications are started, stopped, or there is a change in dose.  2. You experience any bleeding that is not easily stopped or if it is recurrent.  3. You notice an increase in bruising or any bruising that does not heal.  4. You are scheduled for surgery, colonoscopy, dental  extraction or any other procedure where you may need to stop your Coumadin (warfarin).

## 2018-02-13 NOTE — PROGRESS NOTES
Patient Information     Patient Name MRN Sex Khoi Blanca [id number] Male 3/29/1932      Progress Notes by Stephen Morataya MD at 2018 10:00 AM     Author:  Stephen Morataya MD Service:  (none) Author Type:  Physician     Filed:  2018  9:55 AM Encounter Date:  2018 Status:  Signed     :  Stephen Morataya MD (Physician)            SUBJECTIVE:    Khoi Rodriguez is a 85 y.o. male who presents for rash    HPI    Itchy rash for about 2 months on his back and upper shoulders.  Felt it was from the dry air at home.  Started a humidifier in his room, not helping.  Has tried triamcinolone cream, uses it daily, applied by the CNA who helps him bath.  He cannot reach the areas.  Wondering if he can use an allergy med, otc.    No Known Allergies,   Current Outpatient Prescriptions on File Prior to Visit       Medication  Sig Dispense Refill     aspirin 81 mg tablet Take 81 mg by mouth once daily with a meal.       citalopram (CELEXA) 10 mg tablet Take 1 tablet by mouth once daily. 90 tablet 3     gabapentin (NEURONTIN) 300 mg capsule Take 1 capsule by mouth 3 times daily. 270 capsule 3     insulin aspart protamine-insulin aspart (NOVOLOG MIX 70-30) 100 unit/mL (70-30) FLEXPEN Inject 40 Units subcutaneous 2 times daily before meals. 100 mL 3     ketoconazole 2% topical (NIZORAL) cream Apply  topically to affected area(s) 2 times daily. 1 Tube 3     melatonin 10 mg tab Take  by mouth.  0     metroNIDAZOLE (METROGEL) 0.75 % gel Apply  topically to affected area(s) 2 times daily. 45 g 11     nitroglycerin (NITROSTAT) 0.3 mg SL tablet Place 0.3 mg under the tongue every 5 minutes if needed.       nystatin (MYCOSTATIN) cream Apply  topically to affected area(s) 2 times daily. To rash       simvastatin (ZOCOR) 40 mg tablet TAKE 1 TABLET BY MOUTH AT BEDTIME 90 tablet 2     traMADol (ULTRAM) 50 mg tablet Take 1 tablet by mouth every 6 hours if needed for Pain. 30 tablet 3     triamcinolone (ARISTOCORT;  KENALOG) 0.1 % cream Apply  topically to affected area(s) 2 times daily. 80 g 2     warfarin (COUMADIN) 2.5 mg tablet TAKE ONE TABLET BY MOUTH SIX DAYS A WEEK AND THEN TWO TABLETS BY MOUTH ONE DAY PER WEEK 180 tablet 3     No current facility-administered medications on file prior to visit.    ,   Past Medical History:     Diagnosis  Date     Abnormal cardiac CT angiography     Occlusion of 2 obtuse marginal stents,70% mid LAD  lesion stented.      Atrial fibrillation (HC)      Atrial fibrillation (HC)      Closed compression fracture of thoracic vertebra (HC) 10/22/2015    Incidental finding on CXR 10/21/15--T8-9--new from CXR 2009      Coronary artery stenting 06/2006     Coronary heart disease      Coronary heart disease      Diabetes mellitus, type 2 (HC)      Diabetes mellitus, type 2 (HC)      Hx of left cataract extraction      LAD stenosis 07/06    Followup angiography showed occlusion of the two obtuse marginal stents, 70% mid LAD lesion which was stented.        S/P coronary artery stent placement 2006    Arthrectomy      S/P laparoscopic cholecystectomy 05/01     S/P placement of cardiac pacemaker 2002    and   Past Surgical History:      Procedure  Laterality Date     CATARACT REMOVAL      Left eye       LAP CHOLECYSTECTOMY  2001       REVIEW OF SYSTEMS:  Review of Systems   Constitutional: Negative for chills and fever.   Skin: Positive for itching and rash.   Endo/Heme/Allergies: Does not bruise/bleed easily.       OBJECTIVE:  /68 (Cuff Site: Right Arm, Position: Sitting, Cuff Size: Adult Regular)  Temp 98.7  F (37.1  C) (Tympanic)  Resp 16  Wt 85.5 kg (188 lb 6.4 oz)  BMI 27.9 kg/m2    EXAM:   Physical Exam   Constitutional: He is oriented to person, place, and time and well-developed, well-nourished, and in no distress. No distress.   Neurological: He is alert and oriented to person, place, and time.   Skin: He is not diaphoretic.   Upper back with about 15 red patches, rough and circular,  most consistent with inflamed seborrheic keratoses.  Moderately excoriated.       ASSESSMENT/PLAN:    ICD-10-CM    1. Xerosis of skin L85.3 betamethasone valerate 0.1% topical (VALISONE) lotion        Plan:  Will do 2 weeks of a potent steroid lotion, then after this is done, daily application of Lubriderm or Eucerin, heavily to the entire back.  Stop the triamcinolone cream.    Stephen Morataya MD ....................  2/6/2018   9:52 AM

## 2018-02-13 NOTE — TELEPHONE ENCOUNTER
Patient Information     Patient Name MRN Khoi Dinh [id number] Male 3/29/1932      Telephone Encounter by Stephen Morataya MD at 2018 10:25 AM     Author:  Stephen Morataya MD Service:  (none) Author Type:  Physician     Filed:  2018 10:26 AM Encounter Date:  2018 Status:  Signed     :  Stephen Morataya MD (Physician)            No room today.  Later in the week.  .sing

## 2018-02-13 NOTE — NURSING NOTE
Patient Information     Patient Name MRN Khoi Dinh [id number] Male 3/29/1932      Nursing Note by Keeley Mirza at 2018 10:00 AM     Author:  Keeley Mirza Service:  (none) Author Type:  (none)     Filed:  2018  9:46 AM Encounter Date:  2018 Status:  Signed     :  Keeley Mirza            Rash that is itching bad  Keeley Mirza ....................  2018   9:36 AM

## 2018-02-13 NOTE — PATIENT INSTRUCTIONS
Patient Information     Patient Name MRN Khoi Dinh [id number] Male 3/29/1932      Patient Instructions by Stephen Morataya MD at 2018 10:00 AM     Author:  Stephen Morataya MD Service:  (none) Author Type:  Physician     Filed:  2018  9:53 AM Encounter Date:  2018 Status:  Signed     :  Stephen Morataya MD (Physician)            Will do 2 weeks of a potent steroid lotion, then after this is done, daily application of Lubriderm or Eucerin, heavily to the entire back.  Stop the triamcinolone cream.

## 2018-02-13 NOTE — TELEPHONE ENCOUNTER
Patient Information     Patient Name MRN Khoi Dinh [id number] Male 3/29/1932      Telephone Encounter by Miranda Tanner at 2018 10:07 AM     Author:  Miranda Tanner Service:  (none) Author Type:  (none)     Filed:  2018 10:08 AM Encounter Date:  2018 Status:  Signed     :  Miranda Tanner            TJP- Patient states he has an itch that will not go away and is wondering in TJP can work him in today.  Thank you.

## 2018-02-22 NOTE — NURSING NOTE
Lidocaine ordered by Stephen Morataya MD.  Medication administered by Stephen Morataya MD  Lot # [id number]  Exp. [id number]  NDC 79221-398-54  Patient tolerated well.  Zeinab Peterson LPN 2/22/2018 3:43 PM

## 2018-02-22 NOTE — NURSING NOTE
Patient presents in the clinic for a medication follow up.  Zeinab ePterson LPN 2/22/2018 2:24 PM

## 2018-02-22 NOTE — MR AVS SNAPSHOT
"              After Visit Summary   2/22/2018    Khoi Rodriguez    MRN: [id number]           Patient Information     Date Of Birth          3/29/1932        Visit Information        Provider Department      2/22/2018 2:15 PM Stephen Morataya MD [facility]        Today's Diagnoses     Skin lesion    -  1       Follow-ups after your visit        Your next 10 appointments already scheduled     Mar 27, 2018 10:15 AM CDT   [facility]ticoagulation [facility] with GH ANTI COAG 1   [facility]    [address]  [place] [address] [phone]              Who to contact     If you have questions or need follow up information about today's clinic visit or your schedule please contact [facility] [place] [facility] directly at [phone].  Normal or non-critical lab and imaging results will be communicated to you by Inv[employer] Medicalhart, letter or phone within 4 business days after the clinic has received the results. If you do not hear from us within 7 days, please contact the clinic through [employer] or phone. If you have a critical or abnormal lab result, we will notify you by phone as soon as possible.  Submit refill requests through Vol[employer] or call your pharmacy and they will forward the refill request to us. Please allow 3 business days for your refill to be completed.          Additional Information About Your Visit        MyChart Information     Volvant lets you send messages to your doctor, view your test results, renew your prescriptions, schedule appointments and more. To sign up, go to [email or web address] . Click on \"Log in\" on the left side of the screen, which will take you to the Welcome page. Then click on \"Sign up Now\" on the right side of the page.     You will be asked to enter the access code listed below, as well as some personal information. Please follow the directions to create your username and password.     Your access code is: " [email or web address]  Expires: 2018  3:37 PM     Your access code will  in 90 days. If you need help or a new code, please call your [place] clinic or [phone].        Care EveryWhere ID     This is your Care EveryWhere ID. This could be used by other organizations to access your [place] medical records  [id number]        Your Vitals Were     Pulse Temperature Height BMI (Body Mass Index)          60 97.5  F (36.4  C) (Temporal) 5' 8\" (1.727 m) 27.95 kg/m2         Blood Pressure from Last 3 Encounters:   18 138/70   18 122/68   18 134/82    Weight from Last 3 Encounters:   18 183 lb 12.8 oz (83.4 kg)   18 188 lb 6.4 oz (85.5 kg)   18 191 lb 2 oz (86.7 kg)              We Performed the Following     Surgical pathology exam     UNLISTED SKIN PROCEDURE        Primary Care Provider Office Phone # Fax #    Stephen Morataya [phone] [address] [facility] [address]        Equal Access to Services     Dav[facility] campusELISE AH: Hadii liang churcho Socorbin, waaxda luqadaha, qaybta kaalmada adeegyada, matt hernández. So [facility] [phone].    ATENCIÓN: Si habla español, tiene a alejandra disposición servicios gratuitos de asistencia lingüística. Llame al [phone].    We comply with applicable federal civil rights laws and [place] laws. We do not discriminate on the basis of race, color, national origin, age, disability, sex, sexual orientation, or gender identity.            Thank you!     Thank you for choosing [facility] AND hospitals  for your care. Our goal is always to provide you with excellent care. Hearing back from our patients is one way we can continue to improve our services. Please take a few minutes to complete the written survey that you may receive in the mail after your visit with us. Thank you!             Your Updated Medication List - Protect others around you: Learn how to safely use, store and throw " away your medicines at www.disposemymeds.org.          This list is accurate as of 2/22/18  3:37 PM.  Always use your most recent med list.                   Brand Name Dispense Instructions for use Diagnosis    aspirin 81 MG tablet      Take 81 mg by mouth daily with food        citalopram 10 MG tablet    celeXA     Take 10 mg by mouth        gabapentin 300 MG capsule    NEURONTIN     Take 300 mg by mouth        insulin aspart prot & aspart injection    NovoLOG MIX 70/30 PEN     Inject 40 Units Subcutaneous        ketoconazole 2 % cream    NIZORAL          Melatonin 10 MG Tabs tablet           metroNIDAZOLE 0.75 % topical gel    METROGEL          nitroGLYcerin 0.3 MG sublingual tablet    NITROSTAT     Place 0.3 mg under the tongue        nystatin cream    MYCOSTATIN          simvastatin 40 MG tablet    ZOCOR     Take 40 mg by mouth        traMADol 50 MG tablet    ULTRAM     Take 50 mg by mouth        triamcinolone 0.1 % cream    KENALOG          warfarin 2.5 MG tablet    COUMADIN     TAKE ONE TABLET BY MOUTH SIX DAYS A WEEK AND THEN TWO TABLETS BY MOUTH ONE DAY PER WEEK

## 2018-02-22 NOTE — PROGRESS NOTES
SUBJECTIVE:   Khoi Rodriguez is a 85 year old male who presents to clinic today for the following health issues:    HPI Comments: Has had a rash now for about 4 weeks now.  Has a home health aid who applies the steroid cream to his back which has nearly resolved them.  Now has a few on the abdomen and chest.  these are new.  No new meds.  No new soaps. Even tried washing clothes with just water.  The itching resolves when he goes to bed and while sleeping, but about 1 hour after waking it will resume.  The most recent med he started was celexa, about 3 months ago.          Patient Active Problem List    Diagnosis Date Noted     Unspecified atrial fibrillation 02/11/2018     Priority: Medium     Atrial fibrillation (H) 01/16/2018     Priority: Medium     Coronary atherosclerosis 01/16/2018     Priority: Medium     Overview:   intermittent       Hypercholesterolemia 01/16/2018     Priority: Medium     Proliferative diabetic retinopathy without macular edema associated with type 2 diabetes mellitus (H) 03/03/2017     Priority: Medium     Controlled type 2 diabetes mellitus without complication, with long-term current use of insulin (H) 12/30/2016     Priority: Medium     Closed compression fracture of lumbar vertebra (H) 06/02/2016     Priority: Medium     Retinal tear of right eye 06/02/2016     Priority: Medium     Painful diabetic neuropathy (H) 02/24/2016     Priority: Medium     Closed compression fracture of thoracic vertebra (H) 10/22/2015     Priority: Medium     Overview:   Incidental finding on CXR 10/21/15--T8-9--new from CXR 2009       Anticoagulation monitoring, INR range 2-3 07/23/2015     Priority: Medium     Rotator cuff dysfunction 10/21/2013     Priority: Medium     Leg weakness 10/12/2012     Priority: Medium     Actinic keratosis 04/17/2012     Priority: Medium     Cardiac pacemaker in situ 03/13/2012     Priority: Medium     Cataract, senile 02/14/2012     Priority: Medium     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, not stated as uncontrolled(250.50) (H) 06/27/2011     Priority: Medium     Degeneration of cervical intervertebral disc 03/30/2011     Priority: Medium     Rosacea 03/31/2010     Priority: Medium     Past Surgical History:   Procedure Laterality Date     EXTRACAPSULAR CATARACT EXTRATION WITH INTRAOCULAR LENS IMPLANT      Left eye     LAPAROSCOPIC CHOLECYSTECTOMY      2001     Current Outpatient Prescriptions   Medication Sig Dispense Refill     aspirin 81 MG tablet Take 81 mg by mouth daily with food       citalopram (CELEXA) 10 MG tablet Take 10 mg by mouth       gabapentin (NEURONTIN) 300 MG capsule Take 300 mg by mouth       insulin aspart prot & aspart (NOVOLOG MIX 70/30 PEN) injection Inject 40 Units Subcutaneous       ketoconazole (NIZORAL) 2 % cream        Melatonin 10 MG TABS tablet        metroNIDAZOLE (METROGEL) 0.75 % topical gel        nitroGLYcerin (NITROSTAT) 0.3 MG sublingual tablet Place 0.3 mg under the tongue       nystatin (MYCOSTATIN) cream        simvastatin (ZOCOR) 40 MG tablet Take 40 mg by mouth       traMADol (ULTRAM) 50 MG tablet Take 50 mg by mouth       triamcinolone (KENALOG) 0.1 % cream        warfarin (COUMADIN) 2.5 MG tablet TAKE ONE TABLET BY MOUTH SIX DAYS A WEEK AND THEN TWO TABLETS BY MOUTH ONE DAY PER WEEK       No Known Allergies    Review of Systems   Constitutional: Negative for fatigue.   Skin: Positive for rash.   Psychiatric/Behavioral: Negative for mood changes.        OBJECTIVE:     /70 (BP Location: Right arm, Patient Position: Sitting, Cuff Size: Adult Regular)  Pulse 60  Temp 97.5  F (36.4  C) (Temporal)  Ht 5' 8\" (1.727 m)  Wt 183 lb 12.8 oz (83.4 kg)  BMI 27.95 kg/m2  Body mass index is 27.95 kg/(m^2).  Physical Exam   Constitutional: He appears well-nourished. No distress.   Skin: He is not diaphoretic.   Upper back with mild excoriations and a few, 5-6 papules, mildly red, rough.  More on " upper and lower chest, with one of these injected with 1% lidocaine, 8 mm punch performed.  Closed with a single 3-0 ethilon suture.   Psychiatric: He has a normal mood and affect. His behavior is normal. Thought content normal.       Diagnostic Test Results:  none     ASSESSMENT/PLAN:         1. Skin lesion  I now wonder if this is a reaction to the celexa.  Will have him stop it while waiting for the path biopsy report.  - Surgical pathology exam  - UNLISTED SKIN PROCEDURE      Stephen Morataya MD  [facility]

## 2018-02-28 NOTE — NURSING NOTE
Patient presents to clinic today for a cough starting two weeks ago. He states he feels well otherwise.    Juani Mckenna LPN...................2/28/2018  3:33 PM

## 2018-02-28 NOTE — PROGRESS NOTES
SUBJECTIVE:   Khoi Rodriguez is a 85 year old male presenting with a chief complaint of   Chief Complaint   Patient presents with     Cough   Started couple weeks ago, has some nasal congestion.  Has not tried anything at home for the cough.  Has been taking fluids well.  Denies any shortness of breath fevers or chills.  Denies any GI symptoms associated.    Review of Systems   Constitutional: Negative.    HENT: Positive for congestion.    Eyes: Negative.    Respiratory: Positive for cough. Negative for hemoptysis, sputum production, shortness of breath and wheezing.    Cardiovascular: Negative.    Gastrointestinal: Negative.    Genitourinary: Negative.    Musculoskeletal: Negative.    Skin: Negative.    Neurological: Negative.    Endo/Heme/Allergies: Negative.    Psychiatric/Behavioral: Negative.          Past Medical History:   Diagnosis Date     Abnormal findings on diagnostic imaging of heart and coronary circulation     Occlusion of 2 obtuse marginal stents,70% mid LAD  lesion stented.     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     Atherosclerotic heart disease of native coronary artery without angina pectoris     No Comments Provided     Atrial fibrillation (H)     No Comments Provided     Atrial fibrillation (H)     No Comments Provided     Cataract extraction status of left eye     No Comments Provided     Closed wedge compression fracture of thoracic vertebra (H)     10/22/2015,Incidental finding on CXR 10/21/15--T8-9--new from CXR 2009     Malformation of coronary vessel     06/2006     Presence of cardiac pacemaker     2002     Presence of coronary angioplasty  implant and graft     2006,Arthrectomy     Type 2 diabetes mellitus without complications (H)     No Comments Provided     Type 2 diabetes mellitus without complications (H)     No Comments Provided     Current Outpatient Prescriptions   Medication Sig Dispense Refill     benzonatate (TESSALON) 200 MG capsule Take 1 capsule (200 mg) by mouth 3 times daily as needed for cough 21 capsule 0     aspirin 81 MG tablet Take 81 mg by mouth daily with food       citalopram (CELEXA) 10 MG tablet Take 10 mg by mouth       gabapentin (NEURONTIN) 300 MG capsule Take 300 mg by mouth       insulin aspart prot & aspart (NOVOLOG MIX 70/30 PEN) injection Inject 40 Units Subcutaneous       ketoconazole (NIZORAL) 2 % cream        Melatonin 10 MG TABS tablet        metroNIDAZOLE (METROGEL) 0.75 % topical gel        nitroGLYcerin (NITROSTAT) 0.3 MG sublingual tablet Place 0.3 mg under the tongue       nystatin (MYCOSTATIN) cream        simvastatin (ZOCOR) 40 MG tablet Take 40 mg by mouth       traMADol (ULTRAM) 50 MG tablet Take 50 mg by mouth       triamcinolone (KENALOG) 0.1 % cream        warfarin (COUMADIN) 2.5 MG tablet TAKE ONE TABLET BY MOUTH SIX DAYS A WEEK AND THEN TWO TABLETS BY MOUTH ONE DAY PER WEEK       Social History   Substance Use Topics     Smoking status: Former Smoker     Packs/day: 0.50     Years: 8.00     Types: Cigarettes     Quit date: 1/1/1962     Smokeless tobacco: Never Used     Alcohol use No       OBJECTIVE  /76 (BP Location: Right arm, Patient Position: Sitting, Cuff Size: Adult Regular)  Pulse 66  Temp 98  F (36.7  C) (Oral)  Wt 181 lb 4 oz (82.2 kg)  SpO2 98%  BMI 27.56 kg/m2    Physical Exam   Constitutional: He is oriented to person, place, and time and well-developed, well-nourished, and in no distress.   HENT:   Head: Normocephalic and atraumatic.   Mouth/Throat: Oropharynx is clear and moist.   Neck: Normal range of motion. Neck supple.   Cardiovascular: Normal rate, regular rhythm and  normal heart sounds.    Pulmonary/Chest: Effort normal.   Musculoskeletal: Normal range of motion.   Lymphadenopathy:     He has no cervical adenopathy.   Neurological: He is alert and oriented to person, place, and time. Gait normal.   Skin: Skin is warm and dry.   Psychiatric: Mood, memory, affect and judgment normal.   Nursing note and vitals reviewed.      Labs:  Results for orders placed or performed in visit on 02/28/18 (from the past 24 hour(s))   XR Chest 2 Views    Narrative    PROCEDURE:  XR CHEST 2 VW    HISTORY:  ; Cough.     COMPARISON:  10/10/2017, 10/21/2015    FINDINGS:   The cardiac silhouette is normal in size. The pulmonary vasculature is  normal.  There is mild diffuse interstitial prominence. This is  unchanged. There is elevation of the left hemidiaphragm, unchanged. No  pleural effusion or pneumothorax. Left-sided cardiac generator device  is stable.      Impression    IMPRESSION:  Stable chest x-ray.      DARIN PAYTON MD           ASSESSMENT:      ICD-10-CM    1. Cough R05 XR Chest 2 Views     benzonatate (TESSALON) 200 MG capsule   2. Upper respiratory tract infection, unspecified type J06.9 benzonatate (TESSALON) 200 MG capsule   3. Chronic anticoagulation Z79.01         Overall seems to be handling well  We discussed clear chest x-ray--minimal cough during office visit      PLAN: Continue fluids to stay well hydrated, hot tea with honey, lozenges and supportive therapies    Tessalon Perles as needed -these will not affect his INR--    Discussed expected course and when to return if any red flag symptoms    Followup:    If not improving or if condition worsens, follow up with your Primary Care Provider, If not improving or if conditions worsens over the next 12-24 hours, go to the Emergency Department    Patient Instructions   You can try Tessalon Perles 1 tab up to 3 times a day as needed for cough along with pushing fluids    Hot tea with honey and keep well hydrated    If any fever,  difficulty breathing or unable to keep fluids down please return to clinic

## 2018-02-28 NOTE — PATIENT INSTRUCTIONS
You can try Tessalon Perles 1 tab up to 3 times a day as needed for cough along with pushing fluids    Hot tea with honey and keep well hydrated    If any fever, difficulty breathing or unable to keep fluids down please return to clinic

## 2018-02-28 NOTE — MR AVS SNAPSHOT
After Visit Summary   2/28/2018    Khoi Rodriguez    MRN: [id number]           Patient Information     Date Of Birth          3/29/1932        Visit Information        Provider Department      2/28/2018 3:30 PM Paola Santizo APRN CNP [facility]        Today's Diagnoses     Cough    -  1    Upper respiratory tract infection, unspecified type          Care Instructions    You can try Tessalon Perles 1 tab up to 3 times a day as needed for cough along with pushing fluids    Hot tea with honey and keep well hydrated    If any fever, difficulty breathing or unable to keep fluids down please return to clinic          Follow-ups after your visit        Follow-up notes from your care team     Return if symptoms worsen or fail to improve.      Your next 10 appointments already scheduled     Mar 27, 2018 10:15 AM CDT   Anticoagulation Visit with PEPE ANTI COAG 1   [facility] ([facility])    [address]  [place] [address] [phone]              Who to contact     If you have questions or need follow up information about today's clinic visit or your schedule please contact [facility] directly at [phone].  Normal or non-critical lab and imaging results will be communicated to you by Retroficiencyhart, letter or phone within 4 business days after the clinic has received the results. If you do not hear from us within 7 days, please contact the clinic through Retroficiencyhart or phone. If you have a critical or abnormal lab result, we will notify you by phone as soon as possible.  Submit refill requests through [employer] or call your pharmacy and they will forward the refill request to us. Please allow 3 business days for your refill to be completed.          Additional Information About Your Visit        MyChart Information     [employer] lets you send messages to your doctor, view your test results, renew your prescriptions,  "schedule appointments and more. To sign up, go to [email or web address] . Click on \"Log in\" on the left side of the screen, which will take you to the Welcome page. Then click on \"Sign up Now\" on the right side of the page.     You will be asked to enter the access code listed below, as well as some personal information. Please follow the directions to create your username and password.     Your access code is: [email or web address]  Expires: 2018  3:37 PM     Your access code will  in 90 days. If you need help or a new code, please call your [place] clinic or [phone].        Care EveryWhere ID     This is your Care EveryWhere ID. This could be used by other organizations to access your [place] medical records  [id number]        Your Vitals Were     Pulse Temperature Pulse Oximetry BMI (Body Mass Index)          66 98  F (36.7  C) (Oral) 98% 27.56 kg/m2         Blood Pressure from Last 3 Encounters:   18 122/76   18 138/70   18 122/68    Weight from Last 3 Encounters:   18 181 lb 4 oz (82.2 kg)   18 183 lb 12.8 oz (83.4 kg)   18 188 lb 6.4 oz (85.5 kg)              We Performed the Following     XR Chest 2 Views          Today's Medication Changes          These changes are accurate as of 18  4:12 PM.  If you have any questions, ask your nurse or doctor.               Start taking these medicines.        Dose/Directions    benzonatate 200 MG capsule   Commonly known as:  TESSALON   Used for:  Cough, Upper respiratory tract infection, unspecified type   Started by:  Paola Santizo APRN CNP        Dose:  200 mg   Take 1 capsule (200 mg) by mouth 3 times daily as needed for cough   Quantity:  21 capsule   Refills:  0            Where to get your medicines      These medications were sent to [facility] [address] [place], [place] [address], [facility] [address]     Phone:  [phone]     benzonatate 200 MG " capsule                Primary Care Provider Office Phone # Fax #    Stephen Morataya [phone] [address]  [facility] [address]        Equal Access to Services     MUSTAPHAJONAS GLORIA : Eugenia stein celia Álvarez, walukeda luqfreda, qaybta kadonovanda benito, matt mishra ajming hawkins laSilvestrefallon hernández. So [facility] [phone].    ATENCIÓN: Si habla español, tiene a alejandra disposición servicios gratuitos de asistencia lingüística. Llame al [phone].    We comply with applicable federal civil rights laws and [place] laws. We do not discriminate on the basis of race, color, national origin, age, disability, sex, sexual orientation, or gender identity.            Thank you!     Thank you for choosing [facility] AND [facility]  for your care. Our goal is always to provide you with excellent care. Hearing back from our patients is one way we can continue to improve our services. Please take a few minutes to complete the written survey that you may receive in the mail after your visit with us. Thank you!             Your Updated Medication List - Protect others around you: Learn how to safely use, store and throw away your medicines at www.disposemymeds.org.          This list is accurate as of 2/28/18  4:12 PM.  Always use your most recent med list.                   Brand Name Dispense Instructions for use Diagnosis    aspirin 81 MG tablet      Take 81 mg by mouth daily with food        benzonatate 200 MG capsule    TESSALON    21 capsule    Take 1 capsule (200 mg) by mouth 3 times daily as needed for cough    Cough, Upper respiratory tract infection, unspecified type       citalopram 10 MG tablet    celeXA     Take 10 mg by mouth        gabapentin 300 MG capsule    NEURONTIN     Take 300 mg by mouth        insulin aspart prot & aspart injection    NovoLOG MIX 70/30 PEN     Inject 40 Units Subcutaneous        ketoconazole 2 % cream    NIZORAL          Melatonin 10 MG Tabs tablet            metroNIDAZOLE 0.75 % topical gel    METROGEL          nitroGLYcerin 0.3 MG sublingual tablet    NITROSTAT     Place 0.3 mg under the tongue        nystatin cream    MYCOSTATIN          simvastatin 40 MG tablet    ZOCOR     Take 40 mg by mouth        traMADol 50 MG tablet    ULTRAM     Take 50 mg by mouth        triamcinolone 0.1 % cream    KENALOG          warfarin 2.5 MG tablet    COUMADIN     TAKE ONE TABLET BY MOUTH SIX DAYS A WEEK AND THEN TWO TABLETS BY MOUTH ONE DAY PER WEEK

## 2018-03-11 NOTE — ED AVS SNAPSHOT
[facility]    [address]    [place] [address]    Phone:  [phone]    Fax:  [phone]                                       Khoi Rodriguez   MRN: [id number]    Department:  [facility]   Date of Visit:  3/11/2018           After Visit Summary Signature Page     I have received my discharge instructions, and my questions have been answered. I have discussed any challenges I see with this plan with the nurse or doctor.    ..........................................................................................................................................  Patient/Patient Representative Signature      ..........................................................................................................................................  Patient Representative Print Name and Relationship to Patient    ..................................................               ................................................  Date                                            Time    ..........................................................................................................................................  Reviewed by Signature/Title    ...................................................              ..............................................  Date                                                            Time

## 2018-03-11 NOTE — ED AVS SNAPSHOT
[facility]    [address]    [place] [address]    Phone:  [phone]    Fax:  [phone]                                       Khoi Rodriguez   MRN: [id number]    Department:  [facility]   Date of Visit:  3/11/2018           Patient Information     Date Of Birth          3/29/1932        Your diagnoses for this visit were:     Pneumonia of left lower lobe due to infectious organism (H)     Liver tumor     Elevated INR     Pruritic disorder     Skin infection        You were seen by Jarvis Ptaten MD.        Discharge Instructions       Up in house as tolerated,  Clinic follow up within 3 days - if unable to get an appt. Return here.  Recheck sooner if increasing cough, fever, bleeding, shortness of breath, or other concerns.   Do not take any warfarin before  being rechecked.    Future Appointments        Provider Department Dept Phone Center    3/27/2018 10:15 AM [facility] Anticoagulation [facility] [phone] [facility]      24 Hour Appointment Hotline       To make an appointment at any [facility], call [phone] ([phone]). If you don't have a family doctor or clinic, we will help you find one. [facility] clinics are conveniently located to serve the needs of you and your family.             Review of your medicines      START taking        Dose / Directions Last dose taken    cyproheptadine 4 MG tablet   Commonly known as:  PERIACTIN   Dose:  4 mg   Quantity:  30 tablet        Take 1 tablet (4 mg) by mouth 3 times daily as needed for allergies   Refills:  0        levofloxacin 500 MG tablet   Commonly known as:  LEVAQUIN   Dose:  500 mg   Quantity:  10 tablet        Take 1 tablet (500 mg) by mouth daily for 10 days   Refills:  0          Our records show that you are taking the medicines listed below. If these are incorrect, please call your family doctor or clinic.        Dose / Directions Last  dose taken    aspirin 81 MG tablet   Dose:  81 mg        Take 81 mg by mouth daily with food   Refills:  0        benzonatate 200 MG capsule   Commonly known as:  TESSALON   Quantity:  21 capsule        TAKE 1 CAPSULE(200 MG) BY MOUTH THREE TIMES DAILY AS NEEDED FOR COUGH   Refills:  0        cetirizine 10 MG tablet   Commonly known as:  zyrTEC   Dose:  10 mg        Take 10 mg by mouth   Refills:  0        gabapentin 300 MG capsule   Commonly known as:  NEURONTIN   Dose:  300 mg        Take 300 mg by mouth   Refills:  0        insulin aspart prot & aspart injection   Commonly known as:  NovoLOG MIX 70/30 PEN   Dose:  40 Units        Inject 40 Units Subcutaneous   Refills:  0        ketoconazole 2 % cream   Commonly known as:  NIZORAL        Refills:  0        metroNIDAZOLE 0.75 % topical gel   Commonly known as:  METROGEL        Refills:  0        nitroGLYcerin 0.3 MG sublingual tablet   Commonly known as:  NITROSTAT   Dose:  0.3 mg        Place 0.3 mg under the tongue   Refills:  0        nystatin cream   Commonly known as:  MYCOSTATIN        Refills:  0        simvastatin 40 MG tablet   Commonly known as:  ZOCOR   Dose:  40 mg        Take 40 mg by mouth   Refills:  0        traMADol 50 MG tablet   Commonly known as:  ULTRAM   Dose:  50 mg        Take 50 mg by mouth   Refills:  0        triamcinolone 0.1 % ointment   Commonly known as:  KENALOG        DESI EXT AA TWICE D. MAY APR 2 XD UP TO 2 WKS THEN 2-3 TIMES WEEKLY PRN   Refills:  1        warfarin 2.5 MG tablet   Commonly known as:  COUMADIN        TAKE ONE TABLET BY MOUTH SIX DAYS A WEEK AND THEN TWO TABLETS BY MOUTH ONE DAY PER WEEK   Refills:  0                Prescriptions were sent or printed at these locations (2 Prescriptions)                   [id number] [place], [place] - [address]   [address], [facility] [address]    Telephone:  [phone]   Fax:  [phone]   Hours:                   "E-Prescribed (2 of 2)         levofloxacin (LEVAQUIN) 500 MG tablet               cyproheptadine (PERIACTIN) 4 MG tablet                Procedures and tests performed during your visit     Procedure/Test Number of Times Performed    *UA reflex to Microscopic 1    Blood culture 2    CBC with platelets differential 1    CRP inflammation 1    CT Abdomen Pelvis w Contrast 1    Comprehensive metabolic panel 1    Erythrocyte sedimentation rate auto 1    INR 1    Magnesium 1    Peripheral IV catheter 1    Thyrotropin GH 1    Urine Microscopic 1    Vital signs 1    Wound Culture Aerobic Bacterial 2    XR Chest Port 1 View 1      Orders Needing Specimen Collection     None      Pending Results     Date and Time Order Name Status Description    3/11/2018 2021 Wound Culture Aerobic Bacterial In process     3/11/2018 2003 Blood culture In process     3/11/2018 2003 Blood culture In process     3/11/2018 2001 Wound Culture Aerobic Bacterial In process             Pending Culture Results     Date and Time Order Name Status Description    3/11/2018 2021 Wound Culture Aerobic Bacterial In process     3/11/2018 2003 Blood culture In process     3/11/2018 2003 Blood culture In process     3/11/2018 2001 Wound Culture Aerobic Bacterial In process             Thank you for choosing [facility]       Thank you for choosing [facility] for your care. Our goal is always to provide you with excellent care. Hearing back from our patients is one way we can continue to improve our services. Please take a few minutes to complete the written survey that you may receive in the mail after you visit with us. Thank you!        [email or web address] Information     [email or web address] lets you send messages to your doctor, view your test results, renew your prescriptions, schedule appointments and more. To sign up, go to [email or web address] . Click on \"Log in\" on the left side of the screen, which will take you to the Welcome page. Then click on \"Sign up Now\" on the right " side of the page.     You will be asked to enter the access code listed below, as well as some personal information. Please follow the directions to create your username and password.     Your access code is: [email or web address]  Expires: 2018  4:37 PM     Your access code will  in 90 days. If you need help or a new code, please call your [facility] clinic or [phone].        Care EveryWhere ID     This is your Care EveryWhere ID. This could be used by other organizations to access your [facility] medical records  [id number]        Equal Access to Services     [facility]: Eugenia Álvarez, alicia french, gordon oliver, matt sotomayor . So [facility] [phone].    ATENCIÓN: Si habla español, tiene a alejandra disposición servicios gratuitos de asistencia lingüística. Llame al [phone].    We comply with applicable federal civil rights laws and [place] laws. We do not discriminate on the basis of race, color, national origin, age, disability, sex, sexual orientation, or gender identity.            After Visit Summary       This is your record. Keep this with you and show to your community pharmacist(s) and doctor(s) at your next visit.

## 2018-03-12 NOTE — DISCHARGE INSTRUCTIONS
Up in house as tolerated,  Clinic follow up within 3 days - if unable to get an appt. Return here.  Recheck sooner if increasing cough, fever, bleeding, shortness of breath, or other concerns.   Do not take any warfarin before  being rechecked.

## 2018-03-12 NOTE — ED PROVIDER NOTES
History     Chief Complaint   Patient presents with     Rash     HPI  Khoi Rodriguez is a 85 year old male who presents with numerus  Skin lesions, excoriations, primary on head, some on legs,  Ecchymoses on arm and some legs, chronic related to warfarin   Cough 2-3 weeks, sometimes productive,  No fever chills or sweats,  No nausea or vomiting,   Decreased appetite,  No bowel or urin problems,   Rash 3-4 weeks,  Saw a derm.  And given oint. Triamcinolone.   And cetirizine without improvement.  Had a biopsy with  inflammatory changes. Derm  Follow up   April 21.  Changed soap a week ago without improvement,    Stopped celexa 2 weeks ago without improvement.  Problem List:    Patient Active Problem List    Diagnosis Date Noted     Unspecified atrial fibrillation 02/11/2018     Priority: Medium     Atrial fibrillation (H) 01/16/2018     Priority: Medium     Coronary atherosclerosis 01/16/2018     Priority: Medium     Overview:   intermittent       Hypercholesterolemia 01/16/2018     Priority: Medium     Proliferative diabetic retinopathy without macular edema associated with type 2 diabetes mellitus (H) 03/03/2017     Priority: Medium     Controlled type 2 diabetes mellitus without complication, with long-term current use of insulin (H) 12/30/2016     Priority: Medium     Closed compression fracture of lumbar vertebra (H) 06/02/2016     Priority: Medium     Retinal tear of right eye 06/02/2016     Priority: Medium     Painful diabetic neuropathy (H) 02/24/2016     Priority: Medium     Closed compression fracture of thoracic vertebra (H) 10/22/2015     Priority: Medium     Overview:   Incidental finding on CXR 10/21/15--T8-9--new from CXR 2009       Anticoagulation monitoring, INR range 2-3 07/23/2015     Priority: Medium     Rotator cuff dysfunction 10/21/2013     Priority: Medium     Leg weakness 10/12/2012     Priority: Medium     Actinic keratosis 04/17/2012     Priority: Medium     Cardiac pacemaker in situ  03/13/2012     Priority: Medium     Cataract, senile 02/14/2012     Priority: Medium     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, not stated as uncontrolled(250.50) (H) 06/27/2011     Priority: Medium     Degeneration of cervical intervertebral disc 03/30/2011     Priority: Medium     Rosacea 03/31/2010     Priority: Medium        Past Medical History:    Past Medical History:   Diagnosis Date     Abnormal findings on diagnostic imaging of heart and coronary circulation      Acquired absence of other specified parts of digestive tract      Atherosclerotic heart disease of native coronary artery without angina pectoris      Atherosclerotic heart disease of native coronary artery without angina pectoris      Atherosclerotic heart disease of native coronary artery without angina pectoris      Atrial fibrillation (H)      Atrial fibrillation (H)      Cataract extraction status of left eye      Closed wedge compression fracture of thoracic vertebra (H)      Malformation of coronary vessel      Presence of cardiac pacemaker      Presence of coronary angioplasty implant and graft      Type 2 diabetes mellitus without complications (H)      Type 2 diabetes mellitus without complications (H)        Past Surgical History:    Past Surgical History:   Procedure Laterality Date     EXTRACAPSULAR CATARACT EXTRATION WITH INTRAOCULAR LENS IMPLANT      Left eye     LAPAROSCOPIC CHOLECYSTECTOMY      2001       Family History:    No family history on file.    Social History:  Marital Status:   [5]  Social History   Substance Use Topics     Smoking status: Former Smoker     Packs/day: 0.50     Years: 8.00     Types: Cigarettes     Quit date: 1/1/1962     Smokeless tobacco: Never Used     Alcohol use No        Medications:      cetirizine (ZYRTEC) 10 MG tablet   triamcinolone (KENALOG) 0.1 % ointment   levofloxacin (LEVAQUIN) 500 MG tablet   cyproheptadine (PERIACTIN) 4 MG tablet   benzonatate  "(TESSALON) 200 MG capsule   aspirin 81 MG tablet   gabapentin (NEURONTIN) 300 MG capsule   insulin aspart prot & aspart (NOVOLOG MIX 70/30 PEN) injection   simvastatin (ZOCOR) 40 MG tablet   traMADol (ULTRAM) 50 MG tablet   warfarin (COUMADIN) 2.5 MG tablet   ketoconazole (NIZORAL) 2 % cream   metroNIDAZOLE (METROGEL) 0.75 % topical gel   nitroGLYcerin (NITROSTAT) 0.3 MG sublingual tablet   nystatin (MYCOSTATIN) cream         Review of Systems   Constitutional: Negative for chills, diaphoresis and fever.   HENT: Negative for congestion, ear pain and sore throat.    Eyes: Negative for discharge, redness and visual disturbance.   Respiratory: Positive for cough. Negative for shortness of breath.    Cardiovascular: Negative for chest pain, palpitations and leg swelling.   Gastrointestinal: Negative for abdominal pain, constipation, diarrhea, nausea and vomiting.   Endocrine: Negative for polydipsia and polyuria.   Genitourinary: Negative for dysuria and hematuria.   Musculoskeletal: Negative for back pain, joint swelling and neck pain.   Skin:        Itching  -generalized with excoriations.l   Allergic/Immunologic: Negative for immunocompromised state.   Neurological: Negative for dizziness, syncope, light-headedness and headaches.   Hematological: Bruises/bleeds easily.        On warfarin   Psychiatric/Behavioral: Negative for agitation, behavioral problems and confusion.       Physical Exam   BP: 166/76  Pulse: 67  Heart Rate: 67  Temp: 98.1  F (36.7  C)  Resp: 16  Height: 175.3 cm (5' 9\")  Weight: 81.6 kg (180 lb)  SpO2: 96 %      Physical Exam   Constitutional: He is oriented to person, place, and time.   Thin, alert, not distressed  numerous small  1 cm or less sores/excoriation primarily on head and upper back, some on legs.  t   HENT:   Mouth/Throat: Oropharynx is clear and moist.   Ear canals red and inflamed more inferiorly   Eyes: Conjunctivae and EOM are normal. Pupils are equal, round, and reactive to " light. Right eye exhibits no discharge. Left eye exhibits no discharge. No scleral icterus.   Neck: Normal range of motion. Neck supple.   Cardiovascular:   Rate 67, irregular   Pulmonary/Chest:   Rate 16 , inspiratory rales  Left base   Abdominal: Soft. Bowel sounds are normal. He exhibits no distension. There is no tenderness.   Musculoskeletal: He exhibits no edema or tenderness.   Neurological: He is alert and oriented to person, place, and time.   Skin: Skin is warm and dry.   Skin lesions as described above and ecchymoses primarily on dorsal forearms   Psychiatric: He has a normal mood and affect. His behavior is normal. Judgment and thought content normal.   Nursing note and vitals reviewed.      ED Course     ED Course     Procedures                           Results for orders placed or performed during the hospital encounter of 03/11/18 (from the past 24 hour(s))   CBC with platelets differential   Result Value Ref Range    WBC 9.4 4.0 - 11.0 10e9/L    RBC Count 4.66 4.4 - 5.9 10e12/L    Hemoglobin 16.2 13.3 - 17.7 g/dL    Hematocrit 45.6 40.0 - 53.0 %    MCV 98 78 - 100 fl    MCH 34.8 (H) 26.5 - 33.0 pg    MCHC 35.5 31.5 - 36.5 g/dL    RDW 13.8 10.0 - 15.0 %    Platelet Count 139 (L) 150 - 450 10e9/L    Diff Method Automated Method     % Neutrophils 54.9 %    % Lymphocytes 17.7 %    % Monocytes 16.8 %    % Eosinophils 8.2 %    % Basophils 1.5 %    % Immature Granulocytes 0.9 %    Absolute Neutrophil 5.2 1.6 - 8.3 10e9/L    Absolute Lymphocytes 1.7 0.8 - 5.3 10e9/L    Absolute Monocytes 1.6 (H) 0.0 - 1.3 10e9/L    Absolute Eosinophils 0.8 (H) 0.0 - 0.7 10e9/L    Absolute Basophils 0.1 0.0 - 0.2 10e9/L    Abs Immature Granulocytes 0.1 0 - 0.4 10e9/L   Erythrocyte sedimentation rate auto   Result Value Ref Range    Sed Rate 18 (H) 1 - 10 mm/h   CRP inflammation   Result Value Ref Range    CRP Inflammation 3.1 (H) <0.5 mg/L   Comprehensive metabolic panel   Result Value Ref Range    Sodium 138 134 - 144  mmol/L    Potassium 3.4 (L) 3.5 - 5.1 mmol/L    Chloride 101 98 - 107 mmol/L    Carbon Dioxide 27 21 - 31 mmol/L    Anion Gap 10 3 - 14 mmol/L    Glucose 101 70 - 105 mg/dL    Urea Nitrogen 13 7 - 25 mg/dL    Creatinine 0.86 0.70 - 1.30 mg/dL    GFR Estimate 85 >60 mL/min/1.7m2    GFR Estimate If Black >90 >60 mL/min/1.7m2    Calcium 9.5 8.6 - 10.3 mg/dL    Bilirubin Total 2.1 (H) 0.3 - 1.0 mg/dL    Albumin 3.4 (L) 3.5 - 5.7 g/dL    Protein Total 7.1 6.4 - 8.9 g/dL    Alkaline Phosphatase 208 (H) 34 - 104 U/L    ALT 23 7 - 52 U/L    AST 93 (H) 13 - 39 U/L   Magnesium   Result Value Ref Range    Magnesium 2.1 1.9 - 2.7 mg/dL   Thyrotropin GH   Result Value Ref Range    Thyrotropin 2.76 0.34 - 5.60 IU/mL   INR   Result Value Ref Range    INR 8.77 (HH) 0 - 1.3   XR Chest Port 1 View    Narrative    XR CHEST PORT 1 VW,    History: Male, age 85 years; cough;     Comparison: 2/28/2018    Technique: Single view of the chest was obtained.    FINDINGS: Transvenous pacer is intact. There is persistent elevation  of the left hemidiaphragm. Low lung volumes limit evaluation. Cannot  exclude retrocardiac pneumonia.      Impression    IMPRESSION:   1.  Limited by low lung volumes. A retrocardiac pneumonia is not  excluded.    TAMELA JOHNSON MD   *UA reflex to Microscopic   Result Value Ref Range    Color Urine [place]     Appearance Urine Clear     Glucose Urine Negative NEG^Negative mg/dL    Bilirubin Urine Moderate (A) NEG^Negative    Ketones Urine Trace (A) NEG^Negative mg/dL    Specific Gravity Urine >1.030 1.003 - 1.035    Blood Urine Moderate (A) NEG^Negative    pH Urine 5.0 5.0 - 7.0 pH    Protein Albumin Urine Trace (A) NEG^Negative mg/dL    Urobilinogen Urine >8.0 (H) 0.2 - 1.0 EU/dL    Nitrite Urine Negative NEG^Negative    Leukocyte Esterase Urine Negative NEG^Negative    Source Midstream Urine    Urine Microscopic   Result Value Ref Range    WBC Urine 0 - 5 OTO5^0 - 5 /HPF    RBC Urine 10-25 (A) OTO2^O - 2 /HPF     Hyaline Casts O - 2 OTO2^O - 2 /LPF    Squamous Epithelial /LPF Urine Few FEW^Few /LPF    Calcium Oxalate Few (A) NEG^Negative /HPF    Mucous Urine Present (A) NEG^Negative /LPF   CT Abdomen Pelvis w Contrast    Addendum: 3/11/2018      Addendum created by Shiva Liu MD on 3/11/2018 10:46:25 PM CDT     The findings were verbally communicated via telephone conference with MARTHA DUMONT at 10:45 PM CDT on 3/11/2018.         Narrative    Initial report created on 3/11/2018 10:44:26 PM CDT     EXAM:    CT Abdomen and Pelvis With Intravenous Contrast    CLINICAL HISTORY:    85 years old, male; Signs and symptoms; Other: Itching; Additional info:   Itching, liver dysfunction    TECHNIQUE:    Axial computed tomography images of the abdomen and pelvis with intravenous   contrast.  All CT scans at this facility use one or more dose reduction   techniques, viz.: automated exposure control; ma/kV adjustment per patient size   (including targeted exams where dose is matched to indication; i.e. head); or   iterative reconstruction technique.    Coronal and sagittal reformatted images were created and reviewed.    CONTRAST:    100 mL of visipaque 320 administered intravenously.    COMPARISON:    No relevant prior studies available.    FINDINGS:    Lower thorax:  Minimal bilateral pleural effusions.     ABDOMEN:    Liver:  Multiple noncystic low attenuation liver lesions measuring up to 3.6   cm in the left lobe and 4.5 cm in the right lobe.    Gallbladder and bile ducts:  Cholecystectomy.    Pancreas:  Relatively atrophic.  Otherwise unremarkable.    Spleen:  Unremarkable.    Adrenals:  Normal.    Kidneys and ureters:  Few small nonobstructive renal stones bilaterally,   largest measuring 5 mm on the left.  Otherwise unremarkable.    Stomach and bowel:  Unremarkable.  No obstruction.    Appendix:  Mild nonspecific thickening and small calcification in the mid   appendix.  Otherwise unremarkable.     PELVIS:    Bladder:   Unremarkable.    Reproductive:  Unremarkable.     ABDOMEN and PELVIS:    Intraperitoneal space:  No free air.  No significant fluid collection.    Bones/joints:  Somewhat heterogeneous bony density.  Mixed sclerotic and   lytic changes within the L5 vertebral body.    Soft tissues:  Unremarkable.    Vasculature:  Moderate infrarenal atherosclerotic changes.    Lymph nodes:  Unremarkable.  No enlarged lymph nodes.      Impression    IMPRESSION:         Multiple liver lesions compatible with metastatic disease.  Suspected L5   metastasis.  Other bony metastatic disease could be present.  Source not   evident.    THIS DOCUMENT HAS BEEN ELECTRONICALLY SIGNED BY ELENA SMART MD       Assessments & Plan (with Medical Decision Making)     I have reviewed the nursing notes.    I have reviewed the findings, diagnosis, plan and need for follow up with the patient.  Orders Placed This Encounter   Procedures     XR Chest Port 1 View     CT Abdomen Pelvis w Contrast     CBC with platelets differential     Erythrocyte sedimentation rate auto     CRP inflammation     Comprehensive metabolic panel     Magnesium     *UA reflex to Microscopic     Thyrotropin GH     Urine Microscopic     INR     Vital signs     Peripheral IV catheter               Discharge Medication List as of 3/11/2018 11:51 PM      START taking these medications    Details   levofloxacin (LEVAQUIN) 500 MG tablet Take 1 tablet (500 mg) by mouth daily for 10 days, Disp-10 tablet, R-0, E-Prescribe      cyproheptadine (PERIACTIN) 4 MG tablet Take 1 tablet (4 mg) by mouth 3 times daily as needed for allergies, Disp-30 tablet, R-0, E-Prescribe             Final diagnoses:   Pneumonia of left lower lobe due to infectious organism (H)   Liver tumor   Elevated INR   Pruritic disorder   Skin infection   itcinmg improved.   , left lower lung pneumonitis and  Excoriated probaly  Low grade infected skin lesions - cultured.   Rx levaquin.  INR elevated and may increase with  levaquin however   Warfarin stopped and  Vit  K 5 mgs and clinic follow up early this week  And  Follow up for evaluation for suspected  Metastatic cancer to the liver.  Periactin for itching and continue topical loint.  Recheck here promptly if bleeding,   Fever,  Increasing cough or SOB or other concerns.  Caution about risk of falls and bleeding.    3/11/2018   [facility]     Jarvis Patten MD  03/12/18 0139

## 2018-03-12 NOTE — ED NOTES
Presents to triage ambulatory with cane, with complaints of skin rash which he has had for 3 weeks and has been seen in clinic and saw dermatologist in [place] and he was unsure what rash was from.  He gave him some antihistamines and cream and cant see dermatologist until early April.  Denies fever.  Has had a cough for a while.  Rash is on bilateral sides of body and has become quite itchy.

## 2018-03-15 NOTE — PROGRESS NOTES
SUBJECTIVE:   Khoi Rodriguez is a 85 year old male who presents to clinic today for the following health issues:    HPI  Emergency department follow up.  Here with a son, daughter ans son in law.  He is really declining.  Lumbar pains now at 10/10.  Losing weight and bleeding easily.  small right 5th toe contusion has resulted in a blood blister.  No blood in stool.  Continues to cough despite antibiotics.  In the emergency department, he had a chest x ray and a CT of abdomen and pelvis.  Shows possible lung infiltrate, but also several apparent liver mets and a l5 met.   INR was very high, warfarin held.  Has a pending consult with oncology in 1 week.  He has started to use his walker all the time now.  Is afraid of falling at home.       Patient Active Problem List    Diagnosis Date Noted     Unspecified atrial fibrillation 02/11/2018     Priority: Medium     Atrial fibrillation (H) 01/16/2018     Priority: Medium     Coronary atherosclerosis 01/16/2018     Priority: Medium     Overview:   intermittent       Hypercholesterolemia 01/16/2018     Priority: Medium     Proliferative diabetic retinopathy without macular edema associated with type 2 diabetes mellitus (H) 03/03/2017     Priority: Medium     Controlled type 2 diabetes mellitus without complication, with long-term current use of insulin (H) 12/30/2016     Priority: Medium     Closed compression fracture of lumbar vertebra (H) 06/02/2016     Priority: Medium     Retinal tear of right eye 06/02/2016     Priority: Medium     Painful diabetic neuropathy (H) 02/24/2016     Priority: Medium     Closed compression fracture of thoracic vertebra (H) 10/22/2015     Priority: Medium     Overview:   Incidental finding on CXR 10/21/15--T8-9--new from CXR 2009       Anticoagulation monitoring, INR range 2-3 07/23/2015     Priority: Medium     Rotator cuff dysfunction 10/21/2013     Priority: Medium     Leg weakness 10/12/2012     Priority: Medium     Actinic keratosis  04/17/2012     Priority: Medium     Cardiac pacemaker in situ 03/13/2012     Priority: Medium     Cataract, senile 02/14/2012     Priority: Medium     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 No pertinent family history.  Social History     Social History Narrative    Retired from [employer], , wife now with kidney cancer.    Preloaded  10/30/2012    Preloaded 11/28/2012.     Current Outpatient Prescriptions   Medication Sig Dispense Refill     oxyCODONE IR (ROXICODONE) 5 MG tablet Take 1 tablet (5 mg) by mouth every 4 hours as needed for pain maximum 6 tablet(s) per day 30 tablet 0     triamcinolone (KENALOG) 0.1 % ointment DESI EXT AA TWICE D. MAY APR 2 XD UP TO 2 WKS THEN 2-3 TIMES WEEKLY PRN  1     levofloxacin (LEVAQUIN) 500 MG tablet Take 1 tablet (500 mg) by mouth daily for 10 days 10 tablet 0     cyproheptadine (PERIACTIN) 4 MG tablet Take 1 tablet (4 mg) by mouth 3 times daily as needed for allergies 30 tablet 0     benzonatate (TESSALON) 200 MG capsule TAKE 1 CAPSULE(200 MG) BY MOUTH THREE TIMES DAILY AS NEEDED FOR COUGH 21 capsule 0     aspirin 81 MG tablet Take 81 mg by mouth daily with food       gabapentin (NEURONTIN) 300 MG capsule Take 300 mg by mouth       insulin aspart prot & aspart (NOVOLOG MIX 70/30 PEN) injection Inject 40 Units Subcutaneous       ketoconazole (NIZORAL) 2 % cream        metroNIDAZOLE (METROGEL) 0.75 % topical gel        nitroGLYcerin (NITROSTAT) 0.3 MG sublingual tablet Place 0.3 mg under the tongue       nystatin (MYCOSTATIN) cream        simvastatin (ZOCOR) 40 MG tablet Take 40 mg by mouth       traMADol (ULTRAM) 50 MG tablet Take 50 mg by mouth       cetirizine (ZYRTEC) 10 MG tablet Take 10 mg by mouth        warfarin (COUMADIN) 2.5 MG tablet TAKE ONE TABLET BY MOUTH SIX DAYS A WEEK AND THEN TWO TABLETS BY MOUTH ONE DAY PER WEEK       No Known Allergies    Review of Systems   Constitutional: Positive for unexpected weight change.   Gastrointestinal: Negative for hematochezia.   Musculoskeletal: Positive for back pain.   Skin: Positive for rash.   Hematological: Bruises/bleeds easily.        OBJECTIVE:     /64 (BP Location: Right arm, Patient Position: Sitting, Cuff Size: Adult Regular)  Resp 18  There is no height or weight on file to calculate BMI.  Physical Exam    Diagnostic Test Results:  Results for orders placed or performed in visit on 03/15/18   Comprehensive metabolic panel (BMP + Alb, Alk Phos, ALT, AST, Total. Bili, TP)   Result Value Ref Range    Sodium 138 134 - 144 mmol/L    Potassium 3.3 (L) 3.5 - 5.1 mmol/L    Chloride 101 98 - 107 mmol/L    Carbon Dioxide 29 21 - 31 mmol/L    Anion Gap 8 3 - 14 mmol/L    Glucose 123 (H) 70 - 105 mg/dL    Urea Nitrogen 13 7 - 25 mg/dL    Creatinine 0.91 0.70 - 1.30 mg/dL    GFR Estimate 79 >60 mL/min/1.7m2    GFR Estimate If Black >90 >60 mL/min/1.7m2    Calcium 9.6 8.6 - 10.3 mg/dL    Bilirubin Total 1.8 (H) 0.3 - 1.0 mg/dL    Albumin 3.5 3.5 - 5.7 g/dL    Protein Total 7.1 6.4 - 8.9 g/dL    Alkaline Phosphatase 213 (H) 34 - 104 U/L    ALT 22 7 - 52 U/L    AST 89 (H) 13 - 39 U/L   CBC with platelets and differential   Result Value Ref Range    WBC 9.9 4.0 - 11.0 10e9/L    RBC Count 5.02 4.4 - 5.9 10e12/L    Hemoglobin 17.4 13.3 - 17.7 g/dL    Hematocrit 50.4 40.0 - 53.0 %     78 - 100 fl    MCH 34.7 (H) 26.5 - 33.0 pg    MCHC 34.5 31.5 - 36.5 g/dL    RDW 14.4 10.0 - 15.0 %    Platelet Count 152 150 - 450 10e9/L    Diff Method Automated Method     % Neutrophils 64.7 %    % Lymphocytes 13.4 %    % Monocytes 12.8 %    % Eosinophils 6.6 %    % Basophils 1.4 %    % Immature Granulocytes 1.1 %    Absolute Neutrophil 6.4 1.6 - 8.3 10e9/L    Absolute Lymphocytes  1.3 0.8 - 5.3 10e9/L    Absolute Monocytes 1.3 0.0 - 1.3 10e9/L    Absolute Eosinophils 0.7 0.0 - 0.7 10e9/L    Absolute Basophils 0.1 0.0 - 0.2 10e9/L    Abs Immature Granulocytes 0.1 0 - 0.4 10e9/L   Protime GH   Result Value Ref Range    Protime 20.5 (H) 11.9 - 15.2 s   INR   Result Value Ref Range    INR 1.71 (H) 0 - 1.3         ASSESSMENT/PLAN:         (C79.51) Spine metastasis (H)  (primary encounter diagnosis)  Comment: new diagnosis   Plan: oxyCODONE IR (ROXICODONE) 5 MG tablet, CT Chest        w/o Contrast, INR        I discussed with Dr. Blankenship the situation who wants to get a chest CT as part of the work up..  Bx of liver would help, but not yet possible with elevated INR.  Given he is functionally declining, hospice is appropriate.  I spent 50 minutes total with the pt and in coordination of care.  For now the plan is to start on oxycodone for pain relief.  Will meet with oncology and then later that day, follow up with me.  If we aqll decide on hospice I will coordinate this for him.    (I48.2) Permanent atrial fibrillation (H)  Comment: unchanged  Plan: Protime GH        Holding the warfarin, elevate INR likely from the liver disease.    (C78.7) Liver metastasis (H)  Comment: new dx  Plan: Comprehensive metabolic panel (BMP + Alb, Alk         Phos, ALT, AST, Total. Bili, TP), CBC with         platelets and differential, CT Chest w/o         Contrast                Stephen Morataya MD  [facility]      Later in the afternoon they called, asking for a hospice consult.  I called hospice and will have them contact the pt and family.    Stephen Morataya MD on 3/15/2018 at 2:22 PM

## 2018-03-15 NOTE — MR AVS SNAPSHOT
After Visit Summary   3/15/2018    Khoi Rodriguez    MRN: [id number]           Patient Information     Date Of Birth          3/29/1932        Visit Information        Provider Department      3/15/2018 10:15 AM Stephen Morataya MD [facility]        Today's Diagnoses     Spine metastasis (H)    -  1    Permanent atrial fibrillation (H)        Liver metastasis (H)           Follow-ups after your visit        Your next 10 appointments already scheduled     Mar 23, 2018  1:00 PM CDT   [facility]    [address] [facility] [address]   [phone]            Mar 23, 2018  3:00 PM CDT   [facility] Visit with Stephen [facility]aya MD   [facility]    [address] [facility] [id number] [phone]           Bring a current list of meds and any records pertaining to this visit. For Physicals, please bring immunization records and any forms needing to be filled out. Please arrive 10 minutes early to complete paperwork.            Mar 27, 2018 10:15 AM CDT   Anticoagulation Visit with GH ANTI COAG 1   [facility]    [address] [id number] [phone]              Future tests that were ordered for you today     Open Future Orders        Priority Expected Expires Ordered    CT Chest w/o Contrast Routine  3/15/2019 3/15/2018            Who to contact     If you have questions or need follow up information about today's clinic visit or your schedule please contact [facility] directly at [phone].  Normal or non-critical lab and imaging results will be communicated to you by MyChart, letter or phone within 4 business days after the clinic has received the results. If you do not hear from us within 7 days, please contact  "the clinic through Ay[employer] or phone. If you have a critical or abnormal lab result, we will notify you by phone as soon as possible.  Submit refill requests through [employer]! or call your pharmacy and they will forward the refill request to us. Please allow 3 business days for your refill to be completed.          Additional Information About Your Visit        WebsandharMemberConnection Information     Grass[employer]ppers! lets you send messages to your doctor, view your test results, renew your prescriptions, schedule appointments and more. To sign up, go to [email or web address] . Click on \"Log in\" on the left side of the screen, which will take you to the Welcome page. Then click on \"Sign up Now\" on the right side of the page.     You will be asked to enter the access code listed below, as well as some personal information. Please follow the directions to create your username and password.     Your access code is: [email or web address]  Expires: 2018  4:37 PM     Your access code will  in 90 days. If you need help or a new code, please call your [facility] clinic or [phone].        Care EveryWhere ID     This is your Care EveryWhere ID. This could be used by other organizations to access your [facility] medical records  [id number]        Your Vitals Were     Respirations                   18            Blood Pressure from Last 3 Encounters:   03/15/18 124/64   18 153/69   18 122/76    Weight from Last 3 Encounters:   18 180 lb (81.6 kg)   18 181 lb 4 oz (82.2 kg)   18 183 lb 12.8 oz (83.4 kg)              We Performed the Following     CBC with platelets and differential     Comprehensive metabolic panel (BMP + Alb, Alk Phos, ALT, AST, Total. Bili, TP)     Protime GH          Today's Medication Changes          These changes are accurate as of 3/15/18 11:32 AM.  If you have any questions, ask your nurse or doctor.               Start taking these medicines.        Dose/Directions    oxyCODONE IR 5 MG tablet "   Commonly known as:  ROXICODONE   Used for:  Spine metastasis (H)   Started by:  Stephen Morataya MD        Dose:  5 mg   Take 1 tablet (5 mg) by mouth every 4 hours as needed for pain maximum 6 tablet(s) per day   Quantity:  30 tablet   Refills:  0            Where to get your medicines      Some of these will need a paper prescription and others can be bought over the counter.  Ask your nurse if you have questions.     Bring a paper prescription for each of these medications     oxyCODONE IR 5 MG tablet                Primary Care Provider Office Phone # Fax #    Stephen Morataya [phone] [address] [facility] [address]        Equal Access to Services     [facility]: Hadii liang Álvarez, wathea french, gordon oliver, matt sotomayor . So [facility] [phone].    ATENCIÓN: Si habla español, tiene a alejandra disposición servicios gratuitos de asistencia lingüística. Llame al [phone].    We comply with applicable federal civil rights laws and [place] laws. We do not discriminate on the basis of race, color, national origin, age, disability, sex, sexual orientation, or gender identity.            Thank you!     Thank you for choosing [facility] [place]  for your care. Our goal is always to provide you with excellent care. Hearing back from our patients is one way we can continue to improve our services. Please take a few minutes to complete the written survey that you may receive in the mail after your visit with us. Thank you!             Your Updated Medication List - Protect others around you: Learn how to safely use, store and throw away your medicines at www.disposemymeds.org.          This list is accurate as of 3/15/18 11:32 AM.  Always use your most recent med list.                   Brand Name Dispense Instructions for use Diagnosis    aspirin 81 MG tablet      Take 81 mg by mouth daily with food         benzonatate 200 MG capsule    TESSALON    21 capsule    TAKE 1 CAPSULE(200 MG) BY MOUTH THREE TIMES DAILY AS NEEDED FOR COUGH    Cough, Upper respiratory tract infection, unspecified type       cetirizine 10 MG tablet    zyrTEC     Take 10 mg by mouth        cyproheptadine 4 MG tablet    PERIACTIN    30 tablet    Take 1 tablet (4 mg) by mouth 3 times daily as needed for allergies        gabapentin 300 MG capsule    NEURONTIN     Take 300 mg by mouth        insulin aspart prot & aspart injection    NovoLOG MIX 70/30 PEN     Inject 40 Units Subcutaneous        ketoconazole 2 % cream    NIZORAL          levofloxacin 500 MG tablet    LEVAQUIN    10 tablet    Take 1 tablet (500 mg) by mouth daily for 10 days        metroNIDAZOLE 0.75 % topical gel    METROGEL          nitroGLYcerin 0.3 MG sublingual tablet    NITROSTAT     Place 0.3 mg under the tongue        nystatin cream    MYCOSTATIN          oxyCODONE IR 5 MG tablet    ROXICODONE    30 tablet    Take 1 tablet (5 mg) by mouth every 4 hours as needed for pain maximum 6 tablet(s) per day    Spine metastasis (H)       simvastatin 40 MG tablet    ZOCOR     Take 40 mg by mouth        traMADol 50 MG tablet    ULTRAM     Take 50 mg by mouth        triamcinolone 0.1 % ointment    KENALOG     DESI EXT AA TWICE D. MAY APR 2 XD UP TO 2 WKS THEN 2-3 TIMES WEEKLY PRN        warfarin 2.5 MG tablet    COUMADIN     TAKE ONE TABLET BY MOUTH SIX DAYS A WEEK AND THEN TWO TABLETS BY MOUTH ONE DAY PER WEEK

## 2018-03-19 NOTE — TELEPHONE ENCOUNTER
Pt was going to be set up for a CT, and would need to have more pain medication refills and refill of the itching medication for the ER.  Keeley Mirza LPN on 3/19/2018 at 9:58 AM

## 2018-03-19 NOTE — TELEPHONE ENCOUNTER
I had called them last week.  Do I also place an order in the chart?  Stephen Morataya MD on 3/19/2018 at 12:51 PM

## 2018-03-19 NOTE — TELEPHONE ENCOUNTER
Need on order for Hospice, Greg called them and they new nothing  Keeley Mirza, LPN on 3/19/2018 at 12:47 PM

## 2018-03-20 NOTE — TELEPHONE ENCOUNTER
Please place order in chart for Hospice. Thank You  Katharina Woods ....................  3/20/2018   8:28 AM

## 2018-03-23 PROBLEM — C78.7 METASTATIC ADENOCARCINOMA TO LIVER (H): Status: ACTIVE | Noted: 2018-01-01

## 2018-03-23 PROBLEM — C78.7 METASTATIC ADENOCARCINOMA TO LIVER (H): Status: RESOLVED | Noted: 2018-01-01 | Resolved: 2018-01-01

## 2018-03-23 NOTE — PROGRESS NOTES
SUBJECTIVE:   Khoi Rodriguez is a 85 year old male who presents to clinic today for the following health issues:    HPI  Here to follow up on the oncology visit.  He says he is bothered mostly by the cough and itching.  Was given atarax and codiene by oncology.  Given this is stage 4 cancer, Even with an unknown primary, comfort cares advised.  Pt is in agreement with this plan.  Hospice has called them but is waiting for a formal diagnosis.  Lots of lumbar pain, cannot walk more than a block at the most.    Patient Active Problem List    Diagnosis Date Noted     Metastatic cancer to liver (H) 03/23/2018     Priority: Medium     Unspecified atrial fibrillation 02/11/2018     Priority: Medium     Atrial fibrillation (H) 01/16/2018     Priority: Medium     Coronary atherosclerosis 01/16/2018     Priority: Medium     Overview:   intermittent       Hypercholesterolemia 01/16/2018     Priority: Medium     Proliferative diabetic retinopathy without macular edema associated with type 2 diabetes mellitus (H) 03/03/2017     Priority: Medium     Controlled type 2 diabetes mellitus without complication, with long-term current use of insulin (H) 12/30/2016     Priority: Medium     Closed compression fracture of lumbar vertebra (H) 06/02/2016     Priority: Medium     Retinal tear of right eye 06/02/2016     Priority: Medium     Painful diabetic neuropathy (H) 02/24/2016     Priority: Medium     Closed compression fracture of thoracic vertebra (H) 10/22/2015     Priority: Medium     Overview:   Incidental finding on CXR 10/21/15--T8-9--new from CXR 2009       Anticoagulation monitoring, INR range 2-3 07/23/2015     Priority: Medium     Rotator cuff dysfunction 10/21/2013     Priority: Medium     Leg weakness 10/12/2012     Priority: Medium     Actinic keratosis 04/17/2012     Priority: Medium     Cardiac pacemaker in situ 03/13/2012     Priority: Medium     Cataract, senile 02/14/2012     Priority: Medium     Type II or  unspecified type diabetes mellitus with ophthalmic manifestations, not stated as uncontrolled(250.50) (H) 06/27/2011     Priority: Medium     Degeneration of cervical intervertebral disc 03/30/2011     Priority: Medium     Rosacea 03/31/2010     Priority: Medium     Past Surgical History:   Procedure Laterality Date     EXTRACAPSULAR CATARACT EXTRATION WITH INTRAOCULAR LENS IMPLANT      Left eye     LAPAROSCOPIC CHOLECYSTECTOMY      2001     Social History   Substance Use Topics     Smoking status: Former Smoker     Packs/day: 0.50     Years: 8.00     Types: Cigarettes     Quit date: 1/1/1962     Smokeless tobacco: Never Used     Alcohol use No     Current Outpatient Prescriptions   Medication Sig Dispense Refill     sertraline (ZOLOFT) 50 MG tablet Take 1 tablet (50 mg) by mouth daily 90 tablet 3     oxyCODONE IR (ROXICODONE) 5 MG tablet Take 1 tablet (5 mg) by mouth every 4 hours as needed for pain maximum 6 tablet(s) per day 90 tablet 0     hydrOXYzine (ATARAX) 25 MG tablet Take 1 tablet (25 mg) by mouth every 8 hours as needed for itching 120 tablet 3     azithromycin (ZITHROMAX) 250 MG tablet Two tablets first day, then one tablet daily for four days. 6 tablet 0     guaiFENesin-codeine (ROBITUSSIN AC) 100-10 MG/5ML SOLN Take 1-2 tsp. by mouth every 6 hours as needed for cough 420 mL 1     cyproheptadine (PERIACTIN) 4 MG tablet Take 1 tablet (4 mg) by mouth 3 times daily as needed for allergies 30 tablet 3     triamcinolone (KENALOG) 0.1 % ointment DESI EXT AA TWICE D. MAY APR 2 XD UP TO 2 WKS THEN 2-3 TIMES WEEKLY PRN  1     benzonatate (TESSALON) 200 MG capsule TAKE 1 CAPSULE(200 MG) BY MOUTH THREE TIMES DAILY AS NEEDED FOR COUGH 21 capsule 0     aspirin 81 MG tablet Take 81 mg by mouth daily with food       gabapentin (NEURONTIN) 300 MG capsule Take 300 mg by mouth       insulin aspart prot & aspart (NOVOLOG MIX 70/30 PEN) injection Inject 20 Units Subcutaneous 2 times daily       ketoconazole (NIZORAL) 2 %  cream        metroNIDAZOLE (METROGEL) 0.75 % topical gel        nitroGLYcerin (NITROSTAT) 0.3 MG sublingual tablet Place 0.3 mg under the tongue       nystatin (MYCOSTATIN) cream        traMADol (ULTRAM) 50 MG tablet Take 50 mg by mouth       No Known Allergies    Review of Systems     OBJECTIVE:     /70 (BP Location: Left arm, Patient Position: Sitting, Cuff Size: Adult Regular)  Pulse 72  Temp 97.9  F (36.6  C) (Temporal)  Wt 177 lb 2 oz (80.3 kg)  BMI 26.14 kg/m2  Body mass index is 26.14 kg/(m^2).  Physical Exam    Diagnostic Test Results:  none     ASSESSMENT/PLAN:         (C78.7) Metastatic cancer to liver (H)  (primary encounter diagnosis)  Comment: end stage  Plan: sertraline (ZOLOFT) 50 MG tablet        He is very appropriate for hospice and I have contacted them.  Will resume his zoloft.  If the atarax is not helping, then would do a trial of prednisone.  Refilled the oxycodone again, at 90 tabs.  Stop zocor, warfarin, no longer needed.  35 minutes spent with him and his family, over half in counseling.  Hospice consult has been ordered. Refilled the oxycodone, #90 given.  Follow up with me as needed.        Stephen Morataya MD  [facility] [place]

## 2018-03-23 NOTE — PROGRESS NOTES
Visit Date:   03/23/2018      REASON FOR CONSULTATION:  Liver metastases of unknown primary.        REQUESTING PHYSICIAN:  Dr. Morataya.      HISTORY OF PRESENT ILLNESS:  Mr. Rodriguez is an 85-year-old white male with multiple medical problems including atrial fibrillation, coronary artery disease, type 2 diabetes mellitus.  We were asked to evaluate concerning liver metastases of unknown primary.  Apparently, most recently, he had presented to the emergency room with ongoing complaints of pruritus accompanied by skin lesion excoriations primarily on his head, legs with ecchymosis on his arm and legs.  He was on chronic warfarin and complained of a productive cough.  He had been on triamcinolone cream as well as cetirizine without improved.  Biopsy was consistent with inflammatory changes.  Apparently, he underwent a CT abdomen and pelvis as part of the workup and CT abdomen and pelvis was performed on 03/11/2018 and the findings were that the patient had multiple liver lesions compatible with metastatic disease with L5 metastases.  The patient subsequently was sent home and then was seen by Dr. Morataya on 03/15/2018.  At that time, it was noted the patient had at least a 20-pound weight loss, he had declining health, increasing weakness, poor appetite and his Coumadin had been held due to a high INR of 15 and it was entertained that the patient would likely need hospice.  We discussed the case with Dr. Morataya who recommended CT chest.  CT chest was performed subsequently on 03/21/2018 and the findings were that there was ongoing evidence of liver metastases with the liver essentially totally involved by multiple liver lesions.  There was mediastinal adenopathy, right hilar adenopathy as well as bilateral pleural effusions with a focal patchy density at both lung bases.  The patient comes in today, his performance status is a 3.  He states that he sleeps more than 12 hours a day, he is extremely fatigued, has ongoing weight  loss, anorexia, chronic cough, ongoing pruritus.  His LFTs are elevated.  He understands that he likely has terminal metastatic disease to the liver.      PAST MEDICAL HISTORY:  As above.  Atrial fibrillation, coronary artery disease, status post stent placement, hyperlipidemia, proliferative diabetic retinopathy, type 2 diabetes mellitus, closed compression fracture of lumbar vertebrae, retinal tear of right eye, painful diabetic neuropathy, rotator cuff dysfunction, lower extremity weakness, actinic keratoses, senile cataract, rosacea.      ALLERGIES:  NO KNOWN DRUG ALLERGIES.      MEDICATIONS:  He is currently on include:   1.  Periactin 4 mg 3 times daily as needed.   2.  Oxycodone IR 1 tablet or 5 mg every 4 hours as needed.   3.  Zyrtec 10 mg daily.   4.  Triamcinolone 0.1% cream b.i.d.   5.  Tessalon 200 mg by mouth 3 times a day.   6.  Aspirin 81 mg daily.   7.  Neurontin 300 mg daily.   8.  Insulin 40 units subcutaneously daily.   9.  Nitrostat 0.3 mg sublingually p.r.n.   10.  Nystatin cream p.r.n.   11.  Zocor 40 mg daily.   12.  Ultram 50 mg q.8h. p.r.n.   13.  Coumadin currently on hold.      SOCIAL HISTORY:  He smoked at least 15 years for a pack per day, quit in 1968.  Alcohol is negative.  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      FAMILY HISTORY:  Noncontributory.      REVIEW OF SYSTEMS:   CENTRAL NERVOUS SYSTEM:  Negative for headaches.     DERMATOLOGIC:  Significant for episodes of pruritus.     RESPIRATORY:  Significant for cough occasionally productive of sputum.  Dyspnea.  No hemoptysis.   CARDIAC:  Negative for chest pain, palpitations.   GASTROINTESTINAL:  Unremarkable, no change in bowel habits.  There may be dark stools.   MUSCULOSKELETAL:  Significant for chronic back pain.   GENITOURINARY:  Negative for nocturia, hematuria or frequency.     CONSTITUTIONAL:  Negative for fevers, night sweats, He has had a 20-pound weight loss.     HEMATOLOGIC:  Significant for  multiple ecchymoses in both upper extremities.      PHYSICAL EXAMINATION:   GENERAL:  He is a frail elderly white male sitting in a wheelchair.  Performance status is a 3.   VITAL SIGNS:  Reveal blood pressure 126/70, pulse 72, temperature is 97.9.   HEENT:  Atraumatic, normocephalic.  There are multiple excoriations on the forehead, proximal scalp.  Oropharynx reveals some postnasal drip.   NECK:  Supple.   LUNGS:  Reveal decreased breath sounds at the left base.   CARDIAC:  Irregularly irregular.   ABDOMEN:  Soft, normoactive bowel sounds.  There is tenderness over the right upper quadrant.   LYMPHATICS:  No cervical, supraclavicular nodes.     EXTREMITIES:  Bilateral lower extremity weakness.  There is 1+ ankle edema.   SKIN:  Reveals multiple excoriations on the forearm, chest and head.     NEUROLOGIC:  Grossly nonfocal.      LABORATORY DATA:  Were not done.      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 Sorto of radiology who felt the liver was riddled with metastases and this was definitely cancer.  We discussed with the patient that given his performance status, he would not be a candidate for any chemotherapy as he has multiple comorbidities and his performance status is 3.  He would likely not responding to therapy and palliative care or hospice would be the appropriate step here.  We have prescribed Atarax 25 mg q.8h. p.r.n. pruritus also.  We have prescribed Cheratussin cough syrup 1-2 teaspoons every 6 hours as needed as well as Z-Fantasma for his bronchitis.  He will followup with Dr. Morataya concerning further care, but we would recommend terminal hospice care as the patient has less than 6 months likely survival.  The patient and family are in agreement and will proceed with  hospice as planned.        Sixty minutes were spent with the patient, greater than half the time was spent in counseling and coordination of care.         MALCOLM PANDYA MD             D: 2018   T: 2018   MT: PRIMITIVO      Name:     OSCAR MUNIZ   MRN:      [id number]        Account:      [id number]   :      1932           Visit Date:   2018      Document: [id number]       cc: Stephen Morataya MD

## 2018-03-23 NOTE — MR AVS SNAPSHOT
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    If you do not hear from Hospice, or you would like to call to schedule, please call the referring place of service that your provider has listed below.  ______________________________________________________________________    Your provider has referred you to: [facility]  Anticipated Start Date for Services: ASA  PLEASE Schedule Hospice consult for 24 - 48 hours.  Please call if there is need for a variance to this timeline.    REASON FOR REFERRAL: metastatic cancer to liver    ADDITIONAL SERVICES NEEDED: hospice    OTHER PERTINENT INFORMATION: Patient was last seen by provider on 3/23/18 for cancer.  Factors that indicate prognosis of less than 6 months:  End stage disease     Current Outpatient Prescriptions:  cyproheptadine (PERIACTIN) 4 MG tablet, Take 1 tablet (4 mg) by mouth 3 times daily as needed for allergies, Disp: 30 tablet, Rfl: 3  oxyCODONE IR (ROXICODONE) 5 MG tablet, Take 1 tablet (5 mg) by mouth every 4 hours as needed for pain maximum 6 tablet(s) per day, Disp: 30 tablet, Rfl: 0  cetirizine (ZYRTEC) 10 MG tablet, Take 10 mg by mouth, Disp: , Rfl:   triamcinolone (KENALOG) 0.1 % ointment, DESI EXT AA TWICE D. MAY APR 2 XD UP TO 2 WKS THEN 2-3 TIMES WEEKLY PRN, Disp: , Rfl: 1  benzonatate (TESSALON) 200 MG capsule, TAKE 1 CAPSULE(200  MG) BY MOUTH THREE TIMES DAILY AS NEEDED FOR COUGH, Disp: 21 capsule, Rfl: 0  aspirin 81 MG tablet, Take 81 mg by mouth daily with food, Disp: , Rfl:   gabapentin (NEURONTIN) 300 MG capsule, Take 300 mg by mouth, Disp: , Rfl:   insulin aspart prot & aspart (NOVOLOG MIX 70/30 PEN) injection, Inject 40 Units Subcutaneous, Disp: , Rfl:   ketoconazole (NIZORAL) 2 % cream, , Disp: , Rfl:   metroNIDAZOLE (METROGEL) 0.75 % topical gel, , Disp: , Rfl:   nitroGLYcerin (NITROSTAT) 0.3 MG sublingual tablet, Place 0.3 mg under the tongue, Disp: , Rfl:   nystatin (MYCOSTATIN) cream, , Disp: , Rfl:   simvastatin (ZOCOR) 40 MG tablet, Take 40 mg by mouth, Disp: , Rfl:   traMADol (ULTRAM) 50 MG tablet, Take 50 mg by mouth, Disp: , Rfl:   warfarin (COUMADIN) 2.5 MG tablet, TAKE ONE TABLET BY MOUTH SIX DAYS A WEEK AND THEN TWO TABLETS BY MOUTH ONE DAY PER WEEK, Disp: , Rfl:       Patient Active Problem List:     Actinic keratosis     Anticoagulation monitoring, INR range 2-3     Atrial fibrillation (H)     Cardiac pacemaker in situ     Cataract, senile     Closed compression fracture of lumbar vertebra (H)     Closed compression fracture of thoracic vertebra (H)     Controlled type 2 diabetes mellitus without complication, with long-term current use of insulin (H)     Coronary atherosclerosis     Degeneration of cervical intervertebral disc     Painful diabetic neuropathy (H)     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 This patient will be followed by a physician who will periodically review the plan of  "care.    Physician/Provider to provide follow up care: Stephen Morataya certified Physician at time of discharge: Dr gee and Dr Morataya    Please be aware that coverage of these services is subject to the terms and limitations of your health insurance plan.  Call member services at your health plan with any benefit or coverage questions.                  Your next 10 appointments already scheduled     Mar 27, 2018 10:15 AM CDT   [facility] with [facility] ANTI COAG [facility]   [facility]    [address]  [place] [address] [phone]              Who to contact     If you have questions or need follow up information about today's clinic visit or your schedule please contact [facility] directly at [phone].  Normal or non-critical lab and imaging results will be communicated to you by [employer], letter or phone within 4 business days after the clinic has received the results. If you do not hear from us within 7 days, please contact the clinic through [employer] or phone. If you have a critical or abnormal lab result, we will notify you by phone as soon as possible.  Submit refill requests through [employer] or call your pharmacy and they will forward the refill request to us. Please allow 3 business days for your refill to be completed.          Additional Information About Your Visit        [employer] Information     [employer] lets you send messages to your doctor, view your test results, renew your prescriptions, schedule appointments and more. To sign up, go to [email or web address] . Click on \"Log in\" on the left side of the screen, which will take you to the Welcome page. Then click on \"Sign up Now\" on the right side of the page.     You will be asked to enter the access code listed below, as well as some personal information. Please follow the directions to create your username and password.     Your " "access code is: [email or web address]  Expires: 2018  4:37 PM     Your access code will  in 90 days. If you need help or a new code, please call your [facility] or [phone].        Care EveryWhere ID     This is your Care EveryWhere ID. This could be used by other organizations to access your [place] medical records  [id number]        Your Vitals Were     Pulse Temperature Height             72 97.9  F (36.6  C) (Temporal) 1.753 m (5' 9.02\")          Blood Pressure from Last 3 Encounters:   18 126/70   18 126/70   03/15/18 124/64    Weight from Last 3 Encounters:   18 80.3 kg (177 lb 2 oz)   18 81.6 kg (180 lb)   18 82.2 kg (181 lb 4 oz)              We Performed the Following     HOSPICE REFERRAL          Today's Medication Changes          These changes are accurate as of 3/23/18  3:35 PM.  If you have any questions, ask your nurse or doctor.               Start taking these medicines.        Dose/Directions    azithromycin 250 MG tablet   Commonly known as:  ZITHROMAX   Used for:  Acute bronchitis, unspecified organism   Started by:  Wagner Armstrong MD        Two tablets first day, then one tablet daily for four days.   Quantity:  6 tablet   Refills:  0       guaiFENesin-codeine 100-10 MG/5ML Soln   Commonly known as:  ROBITUSSIN AC   Used for:  Acute bronchitis, unspecified organism   Started by:  Wagner Armstrong MD        Dose:  1-2 tsp.   Take 1-2 tsp. 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 Please contact your care team if you have questions.     cetirizine 10 MG tablet   Commonly known as:  zyrTEC   Stopped by:  Stephen Morataya MD           simvastatin 40 MG tablet   Commonly known as:  ZOCOR   Stopped by:  Stephen Morataya MD           warfarin 2.5 MG tablet   Commonly known as:  COUMADIN   Stopped by:  Stephen Morataya MD                Where to get your medicines      These medications were sent to [id number] - [place], [place] - [address]  [address], [facility] [address]     Phone:  [phone]     azithromycin 250 MG tablet    hydrOXYzine 25 MG tablet    sertraline 50 MG tablet         Some of these will need a paper prescription and others can be bought over the counter.  Ask your nurse if you have questions.     Bring a paper prescription for each of these medications     guaiFENesin-codeine 100-10 MG/5ML Soln    oxyCODONE IR 5 MG tablet                Primary Care Provider Office Phone # Fax #    Stephen Morataya [phone]       [address]  [facility] [address]        Equal Access to Services     COLIN CASTRO AH: Hadii liang ku hadasho Soomaali, waaxda luqadaha, qaybta kaalmada adeegyada, waxay rayo hayfallon hernández. So [facility] [phone].    ATENCIÓN: Si habla español, tiene a alejandra disposición servicios gratuitos de asistencia lingüística. Llame al [phone].    We comply with applicable federal civil rights laws and [place] laws. We do not discriminate on the basis of race, color, national origin, age, disability, sex, sexual orientation, or gender identity.            Thank you!     Thank you for choosing [facility] AND [facility]  for your care. Our goal is always to provide you with excellent care. Hearing back from our patients is one way we  can continue to improve our services. Please take a few minutes to complete the written survey that you may receive in the mail after your visit with us. Thank you!             Your Updated Medication List - Protect others around you: Learn how to safely use, store and throw away your medicines at www.disposemymeds.org.          This list is accurate as of 3/23/18  3:35 PM.  Always use your most recent med list.                   Brand Name Dispense Instructions for use Diagnosis    aspirin 81 MG tablet      Take 81 mg by mouth daily with food        azithromycin 250 MG tablet    ZITHROMAX    6 tablet    Two tablets first day, then one tablet daily for four days.    Acute bronchitis, unspecified organism       benzonatate 200 MG capsule    TESSALON    21 capsule    TAKE 1 CAPSULE(200 MG) BY MOUTH THREE TIMES DAILY AS NEEDED FOR COUGH    Cough, Upper respiratory tract infection, unspecified type       cyproheptadine 4 MG tablet    PERIACTIN    30 tablet    Take 1 tablet (4 mg) by mouth 3 times daily as needed for allergies    Spine metastasis (H)       gabapentin 300 MG capsule    NEURONTIN     Take 300 mg by mouth        guaiFENesin-codeine 100-10 MG/5ML Soln    ROBITUSSIN AC    420 mL    Take 1-2 tsp. by mouth every 6 hours as needed for cough    Acute bronchitis, unspecified organism       hydrOXYzine 25 MG tablet    ATARAX    120 tablet    Take 1 tablet (25 mg) by mouth every 8 hours as needed for itching    Metastatic adenocarcinoma to liver (H), Pruritic disorder       insulin aspart prot & aspart injection    NovoLOG MIX 70/30 PEN     Inject 20 Units Subcutaneous 2 times daily        ketoconazole 2 % cream    NIZORAL          metroNIDAZOLE 0.75 % topical gel    METROGEL          nitroGLYcerin 0.3 MG sublingual tablet    NITROSTAT     Place 0.3 mg under the tongue        nystatin cream    MYCOSTATIN          oxyCODONE IR 5 MG tablet    ROXICODONE    90 tablet    Take 1 tablet (5 mg) by mouth every 4 hours as  needed for pain maximum 6 tablet(s) per day    Spine metastasis (H)       sertraline 50 MG tablet    ZOLOFT    90 tablet    Take 1 tablet (50 mg) by mouth daily    Metastatic cancer to liver (H)       traMADol 50 MG tablet    ULTRAM     Take 50 mg by mouth        triamcinolone 0.1 % ointment    KENALOG     DESI EXT AA TWICE D. MAY APR 2 XD UP TO 2 WKS THEN 2-3 TIMES WEEKLY PRN

## 2018-03-23 NOTE — NURSING NOTE
Patient presents to clinic today for a follow up on oncology appointment.     Juani Mckenna LPN...................3/23/2018  1:59 PM

## 2018-03-23 NOTE — MR AVS SNAPSHOT
After Visit Summary   3/23/2018    Khoi Rodriguez    MRN: [id number]           Patient Information     Date Of Birth          3/29/1932        Visit Information        Provider Department      3/23/2018 3:00 PM Stephen Morataya MD [facility]        Today's Diagnoses     Metastatic cancer to liver (H)    -  1    Spine metastasis (H)           Follow-ups after your visit        Follow-up notes from your care team     Return if symptoms worsen or fail to improve.      Your next 10 appointments already scheduled     Mar 23, 2018  3:00 PM CDT   [facility] Visit with Stephen Morataya MD   [facility]    [address]  [place] [id number] [phone]           Bring a current list of meds and any records pertaining to this visit. For Physicals, please bring immunization records and any forms needing to be filled out. Please arrive 10 minutes early to complete paperwork.            Mar 27, 2018 10:15 AM CDT   Anticoagulation Visit with  ANTI COAG 1   [facility]    [address]  [place] [id number] [phone]              Who to contact     If you have questions or need follow up information about today's clinic visit or your schedule please contact [facility] directly at [phone].  Normal or non-critical lab and imaging results will be communicated to you by MyChart, letter or phone within 4 business days after the clinic has received the results. If you do not hear from us within 7 days, please contact the clinic through MyChart or phone. If you have a critical or abnormal lab result, we will notify you by phone as soon as possible.  Submit refill requests through in[employer] or call your pharmacy and they will forward the refill request to us. Please allow 3 business days for your refill to be completed.          Additional  "Information About Your Visit        MyChart Information     [employer] lets you send messages to your doctor, view your test results, renew your prescriptions, schedule appointments and more. To sign up, go to [email or web address] . Click on \"Log in\" on the left side of the screen, which will take you to the Welcome page. Then click on \"Sign up Now\" on the right side of the page.     You will be asked to enter the access code listed below, as well as some personal information. Please follow the directions to create your username and password.     Your access code is: [email or web address]  Expires: 2018  4:37 PM     Your access code will  in 90 days. If you need help or a new code, please call your [place] clinic or [phone].        Care EveryWhere ID     This is your Care EveryWhere ID. This could be used by other organizations to access your [place] medical records  [id number]        Your Vitals Were     Pulse Temperature BMI (Body Mass Index)             72 97.9  F (36.6  C) (Temporal) 26.14 kg/m2          Blood Pressure from Last 3 Encounters:   18 126/70   18 126/70   03/15/18 124/64    Weight from Last 3 Encounters:   18 177 lb 2 oz (80.3 kg)   18 180 lb (81.6 kg)   18 181 lb 4 oz (82.2 kg)              Today, you had the following     No orders found for display         Today's Medication Changes          These changes are accurate as of 3/23/18  2:52 PM.  If you have any questions, ask your nurse or doctor.               Start taking these medicines.        Dose/Directions    azithromycin 250 MG tablet   Commonly known as:  ZITHROMAX   Used for:  Acute bronchitis, unspecified organism   Started by:  Wagner Armstrong MD        Two tablets first day, then one tablet daily for four days.   Quantity:  6 tablet   Refills:  0       guaiFENesin-codeine 100-10 MG/5ML Soln   Commonly known as:  ROBITUSSIN AC   Used for:  Acute bronchitis, unspecified organism   Started by:  " Wagner Armstrong MD        Dose:  1-2 tsp.   Take 1-2 tsp. by mouth every 6 hours as needed for cough   Quantity:  420 mL   Refills:  1       hydrOXYzine 25 MG tablet   Commonly known as:  ATARAX   Used for:  Metastatic adenocarcinoma to liver (H), Pruritic disorder   Started by:  Wagner Armstrong MD        Dose:  25 mg   Take 1 tablet (25 mg) by mouth every 8 hours as needed for itching   Quantity:  120 tablet   Refills:  3       sertraline 50 MG tablet   Commonly known as:  ZOLOFT   Used for:  Metastatic cancer to liver (H)   Started by:  Stephen Morataya MD        Dose:  50 mg   Take 1 tablet (50 mg) by mouth daily   Quantity:  90 tablet   Refills:  3         Stop taking these medicines if you haven't already. Please contact your care team if you have questions.     cetirizine 10 MG tablet   Commonly known as:  zyrTEC   Stopped by:  Stephen Morataya MD           simvastatin 40 MG tablet   Commonly known as:  ZOCOR   Stopped by:  Stephen Morataya MD           warfarin 2.5 MG tablet   Commonly known as:  COUMADIN   Stopped by:  Stephen Morataya MD                Where to get your medicines      These medications were sent to [id number] - [place], [place] - [address] [facility] [address]     Phone:  [phone]     azithromycin 250 MG tablet    hydrOXYzine 25 MG tablet    sertraline 50 MG tablet         Some of these will need a paper prescription and others can be bought over the counter.  Ask your nurse if you have questions.     Bring a paper prescription for each of these medications     guaiFENesin-codeine 100-10 MG/5ML Soln    oxyCODONE IR 5 MG tablet                Primary Care Provider Office Phone # Fax #    Stephen Morataya [phone]       [address] [facility] [address]        Equal Access to Services     COLIN CASTRO AH: Eugenia Álvarez, waaxda luqadaha, qaybta kaalmada adeegyada, matt cade  danica brennansouth laSilvestrefallon ah. So [facility] [phone].    ATENCIÓN: Si dilanla susan, tiene a alejandra disposición servicios gratuitos de asistencia lingüística. Ricardo sharp [phone].    We comply with applicable federal civil rights laws and [place] laws. We do not discriminate on the basis of race, color, national origin, age, disability, sex, sexual orientation, or gender identity.            Thank you!     Thank you for choosing [facility] AND [facility]  for your care. Our goal is always to provide you with excellent care. Hearing back from our patients is one way we can continue to improve our services. Please take a few minutes to complete the written survey that you may receive in the mail after your visit with us. Thank you!             Your Updated Medication List - Protect others around you: Learn how to safely use, store and throw away your medicines at www.disposemymeds.org.          This list is accurate as of 3/23/18  2:52 PM.  Always use your most recent med list.                   Brand Name Dispense Instructions for use Diagnosis    aspirin 81 MG tablet      Take 81 mg by mouth daily with food        azithromycin 250 MG tablet    ZITHROMAX    6 tablet    Two tablets first day, then one tablet daily for four days.    Acute bronchitis, unspecified organism       benzonatate 200 MG capsule    TESSALON    21 capsule    TAKE 1 CAPSULE(200 MG) BY MOUTH THREE TIMES DAILY AS NEEDED FOR COUGH    Cough, Upper respiratory tract infection, unspecified type       cyproheptadine 4 MG tablet    PERIACTIN    30 tablet    Take 1 tablet (4 mg) by mouth 3 times daily as needed for allergies    Spine metastasis (H)       gabapentin 300 MG capsule    NEURONTIN     Take 300 mg by mouth        guaiFENesin-codeine 100-10 MG/5ML Soln    ROBITUSSIN AC    420 mL    Take 1-2 tsp. by mouth every 6 hours as needed for cough    Acute bronchitis, unspecified organism       hydrOXYzine 25 MG tablet    ATARAX    120 tablet     Take 1 tablet (25 mg) by mouth every 8 hours as needed for itching    Metastatic adenocarcinoma to liver (H), Pruritic disorder       insulin aspart prot & aspart injection    NovoLOG MIX 70/30 PEN     Inject 20 Units Subcutaneous 2 times daily        ketoconazole 2 % cream    NIZORAL          metroNIDAZOLE 0.75 % topical gel    METROGEL          nitroGLYcerin 0.3 MG sublingual tablet    NITROSTAT     Place 0.3 mg under the tongue        nystatin cream    MYCOSTATIN          oxyCODONE IR 5 MG tablet    ROXICODONE    90 tablet    Take 1 tablet (5 mg) by mouth every 4 hours as needed for pain maximum 6 tablet(s) per day    Spine metastasis (H)       sertraline 50 MG tablet    ZOLOFT    90 tablet    Take 1 tablet (50 mg) by mouth daily    Metastatic cancer to liver (H)       traMADol 50 MG tablet    ULTRAM     Take 50 mg by mouth        triamcinolone 0.1 % ointment    KENALOG     DESI EXT AA TWICE D. MAY APR 2 XD UP TO 2 WKS THEN 2-3 TIMES WEEKLY PRN

## 2018-03-23 NOTE — NURSING NOTE
Patient presents in the clinic for a consult per recommendation by Stephen Morataya MD, to discuss possible liver cancer.  Zeinab Peterson LPN 3/23/2018 12:57 PM

## 2018-03-26 NOTE — TELEPHONE ENCOUNTER
Notified patient of providers note.  Katharina Woods ....................  3/26/2018   8:49 AM

## 2018-03-26 NOTE — TELEPHONE ENCOUNTER
I will fax in the prednisone.  Can also mix it with everything else we have given him.  Stephen Morataya MD on 3/26/2018 at 8:39 AM

## 2018-03-26 NOTE — TELEPHONE ENCOUNTER
Daughter is requesting prednisone for patient for his itching. She is also wondering if patient can continue the current anti-itch med he takes while taking the prednisone.  [facility]  Pharmacy: Joaquín Woods ....................  3/26/2018   8:10 AM

## 2018-04-02 NOTE — TELEPHONE ENCOUNTER
[facility] pharmacy and pt request a Rx refill for Prednisone.  No Glucocorticoid protocol completed.  It is unclear if PCP Dr. SOHAM Morataya desires pt to continue on Rx of Prednisone.  Will route to PCP for review and consideration of refill.  Unable to complete prescription refill per RN Medication Refill Policy.................... Aspen Gonzales ....................  4/2/2018   2:12 PM

## 2018-07-23 NOTE — PROGRESS NOTES
Patient Information     Patient Name  Khoi Rodriguez MRN  [id number] Sex  Male   3/29/1932      Letter by Stephen Morataya MD at      Author:  Stephen Morataya MD Service:  (none) Author Type:  (none)    Filed:   Encounter Date:  2017 Status:  (Other)           Khoi Rodriguez   [facility] [address]          2017    Dear Mr. Rodriguez:    Following are the tests completed during your last clinic visit.  The results of these tests are normal and require no further attention unless otherwise noted.    Results for orders placed or performed in visit on 17      Hgb A1c      Result  Value Ref Range    HEMOGLOBIN A1C MONITORING (POCT) 6.0 4.0 - 6.2 %    ESTIMATED AVERAGE GLUCOSE  126 mg/dL   BASIC METABOLIC PANEL      Result  Value Ref Range    SODIUM 143 133 - 143 mmol/L    POTASSIUM 3.9 3.5 - 5.1 mmol/L    CHLORIDE 106 98 - 107 mmol/L    CO2,TOTAL 26 21 - 31 mmol/L    ANION GAP 11 5 - 18                    GLUCOSE 70 70 - 105 mg/dL    CALCIUM 9.5 8.6 - 10.3 mg/dL    BUN 16 7 - 25 mg/dL    CREATININE 0.99 0.70 - 1.30 mg/dL    BUN/CREAT RATIO           16                    GFR if African American >60 >60 ml/min/1.73m2    GFR if not African American >60 >60 ml/min/1.73m2   MICROALBUMIN RANDOM URINE      Result  Value Ref Range    ALB RAND URINE            13.7 mg/L    CREATININE,URINE          1.13 g/L    MICROALBUMIN,RAND UR      12.1 <30.0 mg/g creat     If you have any further questions or problems contact my office at  [phone].    Thank you,    Stephen Morataya MD

## 2018-07-23 NOTE — PROGRESS NOTES
Patient Information     Patient Name  Khoi Rodriguez MRN  [id number] Sex  Male   3/29/1932      Letter by Stephen Morataya MD at      Author:  Stephen Morataya MD Service:  (none) Author Type:  (none)    Filed:   Encounter Date:  2/10/2017 Status:  (Other)           Khoi Rodriguez   [facility] [address]          February 10, 2017    Dear Mr. Rodriguez:    A LIMITED refill of gabapentin (NEURONTIN) 300 mg capsule has been called into your pharmacy.    Additional refills require a medication management appointment with Stephen Morataya MD. Please call the clinic at [phone] to schedule your appointment.    Thank you,    The Refill Nurse  [facility]

## 2018-07-23 NOTE — PROGRESS NOTES
Patient Information     Patient Name  Khoi Rodriguez MRN  [id number] Sex  Male   3/29/1932      Letter by Stephen Morataya MD at      Author:  Stephen Morataya MD Service:  (none) Author Type:  (none)    Filed:   Encounter Date:  2017 Status:  (Other)           Khoi Rodriguez   [facility] [address]          2017    Dear Mr. Rodriguez:    A LIMITED refill of simvastatin (ZOCOR) 40 mg tablet has been called into your pharmacy.    Additional refills require a medication management  appointment with Stephen Morataya MD. Please call the clinic at [phone] to schedule your appointment.    Thank you,    The Refill Nurse  [facility]

## 2018-07-23 NOTE — PROGRESS NOTES
Patient Information     Patient Name  Khoi Rodriguez MRN  [id number] Sex  Male   3/29/1932      Letter by Stephen Morataya MD at      Author:  Stephen Morataya MD Service:  (none) Author Type:  (none)    Filed:   Encounter Date:  2018 Status:  (Other)           Khoi Rodriguez   [facility] [address]          2018    Dear Mr. Rodriguez:    Following are the tests completed during your last clinic visit.  The results of these tests are normal and require no further attention unless otherwise noted.    Results for orders placed or performed in visit on 18      Hgb A1c      Result  Value Ref Range    HEMOGLOBIN A1C MONITORING (POCT) 6.2 4.0 - 6.2 %    ESTIMATED AVERAGE GLUCOSE  131 mg/dL   CBC WITH AUTO DIFFERENTIAL      Result  Value Ref Range    WHITE BLOOD COUNT         8.3 4.5 - 11.0 thou/cu mm    RED BLOOD COUNT           5.09 4.30 - 5.90 mil/cu mm    HEMOGLOBIN                17.2 13.5 - 17.5 g/dL    HEMATOCRIT                51.4 37.0 - 53.0 %    MCV                       101 (H) 80 - 100 fL    MCH                       33.8 26.0 - 34.0 pg    MCHC                      33.5 32.0 - 36.0 g/dL    RDW                       13.9 11.5 - 15.5 %    PLATELET COUNT            145 140 - 440 thou/cu mm    MPV                       10.6 6.5 - 11.0 fL    NEUTROPHILS               59.4 42.0 - 72.0 %    LYMPHOCYTES               20.9 20.0 - 44.0 %    MONOCYTES                 12.8 (H) <12.0 %    EOSINOPHILS               5.0 <8.0 %    BASOPHILS                 1.2 <3.0 %    IMMATURE GRANULOCYTES(METAS,MYELOS,PROS) 0.7 %    ABSOLUTE NEUTROPHILS      4.9 1.7 - 7.0 thou/cu mm    ABSOLUTE LYMPHOCYTES      1.7 0.9 - 2.9 thou/cu mm    ABSOLUTE MONOCYTES        1.1 (H) <0.9 thou/cu mm    ABSOLUTE EOSINOPHILS      0.4 <0.5 thou/cu mm    ABSOLUTE BASOPHILS        0.1 <0.3 thou/cu mm    ABSOLUTE IMMATURE GRANULOCYTES(METAS,MYELOS,PROS) 0.1 <=0.3 thou/cu mm         If you have any further questions  or problems contact my office at  [phone].    Thank you,    Stephen Morataya MD

## (undated) RX ORDER — PHYTONADIONE 5 MG/1
TABLET ORAL
Status: DISPENSED
Start: 2018-01-01

## (undated) RX ORDER — CYPROHEPTADINE HYDROCHLORIDE 4 MG/1
TABLET ORAL
Status: DISPENSED
Start: 2018-01-01

## (undated) RX ORDER — SODIUM CHLORIDE AND POTASSIUM CHLORIDE 150; 900 MG/100ML; MG/100ML
INJECTION, SOLUTION INTRAVENOUS
Status: DISPENSED
Start: 2018-01-01

## (undated) RX ORDER — LEVOFLOXACIN 250 MG/1
TABLET, FILM COATED ORAL
Status: DISPENSED
Start: 2018-01-01